# Patient Record
Sex: FEMALE | Race: BLACK OR AFRICAN AMERICAN | HISPANIC OR LATINO | URBAN - METROPOLITAN AREA
[De-identification: names, ages, dates, MRNs, and addresses within clinical notes are randomized per-mention and may not be internally consistent; named-entity substitution may affect disease eponyms.]

---

## 2022-07-09 ENCOUNTER — EMERGENCY (EMERGENCY)
Facility: HOSPITAL | Age: 37
LOS: 1 days | Discharge: HOME | End: 2022-07-09
Admitting: EMERGENCY MEDICINE

## 2022-07-09 VITALS
OXYGEN SATURATION: 99 % | TEMPERATURE: 98 F | HEART RATE: 80 BPM | DIASTOLIC BLOOD PRESSURE: 83 MMHG | RESPIRATION RATE: 18 BRPM | SYSTOLIC BLOOD PRESSURE: 122 MMHG

## 2022-07-09 VITALS
DIASTOLIC BLOOD PRESSURE: 71 MMHG | SYSTOLIC BLOOD PRESSURE: 118 MMHG | HEART RATE: 84 BPM | RESPIRATION RATE: 18 BRPM | TEMPERATURE: 98 F | OXYGEN SATURATION: 100 %

## 2022-07-09 PROCEDURE — 73590 X-RAY EXAM OF LOWER LEG: CPT | Mod: 26,LT

## 2022-07-09 PROCEDURE — 73610 X-RAY EXAM OF ANKLE: CPT | Mod: 26,LT,76

## 2022-07-09 PROCEDURE — 73610 X-RAY EXAM OF ANKLE: CPT | Mod: 26,LT

## 2022-07-09 PROCEDURE — 99284 EMERGENCY DEPT VISIT MOD MDM: CPT

## 2022-07-09 PROCEDURE — 73630 X-RAY EXAM OF FOOT: CPT | Mod: 26,LT

## 2022-07-09 RX ORDER — OXYCODONE AND ACETAMINOPHEN 5; 325 MG/1; MG/1
1 TABLET ORAL ONCE
Refills: 0 | Status: DISCONTINUED | OUTPATIENT
Start: 2022-07-09 | End: 2022-07-09

## 2022-07-09 RX ORDER — KETOROLAC TROMETHAMINE 30 MG/ML
30 SYRINGE (ML) INJECTION ONCE
Refills: 0 | Status: DISCONTINUED | OUTPATIENT
Start: 2022-07-09 | End: 2022-07-09

## 2022-07-09 RX ADMIN — OXYCODONE AND ACETAMINOPHEN 1 TABLET(S): 5; 325 TABLET ORAL at 17:54

## 2022-07-09 RX ADMIN — Medication 30 MILLIGRAM(S): at 17:54

## 2022-07-09 NOTE — ED PROVIDER NOTE - LOWER EXTREMITY EXAM, LEFT
+tenderness at lateral malleolar region; minimal swelling; no crepitus; no joint laxity, no redness, no warmth; 5/5 strength; sensation intact to light touch; < 2 sec capillary refill; 2+ pulses

## 2022-07-09 NOTE — ED PROVIDER NOTE - NSFOLLOWUPINSTRUCTIONS_ED_ALL_ED_FT
Advance activity as tolerated.  Continue all previously prescribed medications as directed unless otherwise instructed.  Take Tylenol 650mg (Two 325 mg pills) every 4-6 hours as needed for pain or fevers. Take Motrin (also sold as Advil or Ibuprofen) 400-600 mg (two or three 200 mg over the counter pills) every 8 hours as needed for moderate pain or fevers-- take with food.  Ambulate with aircast in place.  Ambulate with crutches as shown to you in the emergency department..  Keep extremity elevated and wrapped.  Apply cool compresses to affected area for 15 minutes, 3-4 times per day.  Follow up with your primary care physician and orthopedics (referral list provided or call [phone] to make an appointment with the orthopedics clinic) in 48-72 hours- bring copies of your results.  Return to the ER for worsening or persistent symptoms, including but not limited to worsening/persistent pain, fevers, numbness, weakness, swelling, falls, difficulty walking, and/or ANY NEW OR CONCERNING SYMPTOMS. If you have issues obtaining follow up, please call: [phone] to obtain a doctor or specialist who takes your insurance in your area.  You may call [phone] to make an appointment with the internal medicine clinic. Advance activity as tolerated.  Continue all previously prescribed medications as directed unless otherwise instructed.  Take Tylenol 650mg (Two 325 mg pills) every 4-6 hours as needed for pain or fevers. Take Motrin (also sold as Advil or Ibuprofen) 400-600 mg (two or three 200 mg over the counter pills) every 8 hours as needed for moderate pain or fevers-- take with food.  Keep splint clean and dry.  Follow up with your primary care physician and orthopedics (Dr. Lazaro; referral list provided or call [phone] to make an appointment with the orthopedics clinic) in 48-72 hours- bring copies of your results.  Return to the ER for worsening or persistent symptoms, including but not limited to worsening/persistent pain, fevers, numbness, weakness, swelling, falls, difficulty walking, and/or ANY NEW OR CONCERNING SYMPTOMS. If you have issues obtaining follow up, please call: [phone] to obtain a doctor or specialist who takes your insurance in your area.  You may call [phone] to make an appointment with the internal medicine clinic.

## 2022-07-09 NOTE — ED PROVIDER NOTE - CARE PROVIDER_API CALL
Home
David Lazaro)  Orthopedics  [address] [place] [address]  Phone: [phone]  Fax: [phone]  Follow Up Time:

## 2022-07-09 NOTE — ED PROVIDER NOTE - PATIENT PORTAL LINK FT
You can access the FollowMyHealth Patient Portal offered by [facility] by registering at the following website: [email or web address]. By joining [facility]’s FollowMyHealth portal, you will also be able to view your health information using other applications (apps) compatible with our system.

## 2022-07-09 NOTE — ED ADULT TRIAGE NOTE - CHIEF COMPLAINT QUOTE
Patient brought to ER by EMS from [facility] and injured her left ankle.

## 2022-07-09 NOTE — CONSULT NOTE ADULT - SUBJECTIVE AND OBJECTIVE BOX
Orthopedic Surgery Consult Note    37yFemale p/w left ankle pain and inability to bear weight s/p mechanical fall. Patient twisted her ankle while roller skating and felt immediate pain. Denies headstrike/LOC. Denies numbness/tingling in the feet/toes. No other bone or joint complaints.               Vital Signs Last 24 Hrs  T(C): 36.9 (07-09-22 @ 16:48), Max: 36.9 (07-09-22 @ 16:48)  T(F): 98.5 (07-09-22 @ 16:48), Max: 98.5 (07-09-22 @ 16:48)  HR: 84 (07-09-22 @ 16:48) (80 - 84)  BP: 118/71 (07-09-22 @ 16:48) (118/71 - 122/83)  BP(mean): --  RR: 18 (07-09-22 @ 16:48) (18 - 18)  SpO2: 100% (07-09-22 @ 16:48) (99% - 100%)    Physical Exam  Gen: Nad  LLE: Skin intact swelling about ankle  +TTP medial/lateral malleolus  motor intact distally GS/TA/EHL/FHL  SILT S/S/SP/SP/T  2+ DP pulse, extremity WWP    Imaging:  XR showing left Barrera C distal fibula fracture with widening of the medial clear space    Procedure: Closed reduction performed followed by placement of a well padded trilam splint. Patient tolerated the procedure well. Post procedure imaging obtained and showed improved alignment, but ankle was still subluxed. New reduction attempted with new trilam splint. Post procedure imaging obtained and showed acceptable alignment. Post procedure the patient was NV intact.    A/P: 37yFemale with left bimalleolar equivalent ankle fracture s/p closed reduction and splinting    - Pain control  - NWB on left lower extremity in splint  - Keep splint clean, dry and intact until follow up  - Cane/crutches/walker as needed  - Ice/elevation  - Follow up with Dr. Lazaro early next week, call [phone]  for appointment

## 2022-07-09 NOTE — CONSULT NOTE ADULT - ASSESSMENT
A/P: 37yFemale with left bimalleolar equivalent ankle fracture s/p closed reduction and splinting    - Pain control  - NWB on left lower extremity in splint  - Keep splint clean, dry and intact until follow up  - Cane/crutches/walker as needed  - Ice/elevation  - Follow up with Dr. Lazaro early next week, call [phone]  for appointment

## 2022-07-09 NOTE — ED PROVIDER NOTE - CLINICAL SUMMARY MEDICAL DECISION MAKING FREE TEXT BOX
Pt is a 36 y/o F no pertinent PMhx p/w left ankle pain today. -- likely sprain; r/o fracture -- xray ankle/tib/fib/foot

## 2022-07-09 NOTE — ED ADULT NURSE NOTE - CHIEF COMPLAINT QUOTE
Patient brought to ER by EMS from [facility] and injured her left ankle.

## 2022-07-09 NOTE — ED PROVIDER NOTE - PROGRESS NOTE DETAILS
CARIE GARCIA:  Xray with following impression: "There is a spiral fracture of the distal fibula above the lateral   malleolus. No additional fractures are identified. There is no proximal   fibular dislocation. The ankle mortise is congruent. Soft tissue swelling   is seen around the ankle more pronounced over the medial malleolus."  Reduction and cast applied by orthopedics.  They recommend discharge and outpatient follow up with Dr. Lazaro.   Pt medically stable for discharge.  Strict return precautions given. Pt to follow up with PMD and ortho.

## 2022-07-09 NOTE — ED PROVIDER NOTE - OBJECTIVE STATEMENT
Pt is a 38 y/o F no pertinent PMhx p/w left ankle pain today.  Pt reports ~1 hour ago, while roller skating, pt twisted left ankle in an eversion manner, causing pt to fall and land onto hands w/o head trauma or LOC. Pt reports sudden onset severe pain at lateral malleolar region; has not attempted to ambulate since.  Pt reports pain significantly improved, 2/10 in intensity, after application of cool compress.  Denies any fevers, chills, nausea, vomiting, headache, neck pain, back pain, chest pain, abdominal pain, hip pain, injuries elsewhere, use of blood thinners or any other specific complaints.

## 2022-07-10 PROBLEM — Z00.00 ENCOUNTER FOR PREVENTIVE HEALTH EXAMINATION: Status: ACTIVE | Noted: 2022-07-10

## 2022-07-13 ENCOUNTER — APPOINTMENT (OUTPATIENT)
Dept: ORTHOPEDIC SURGERY | Facility: CLINIC | Age: 37
End: 2022-07-13

## 2022-07-13 VITALS — HEIGHT: 64 IN | BODY MASS INDEX: 31.58 KG/M2 | WEIGHT: 185 LBS

## 2022-07-13 PROCEDURE — 73610 X-RAY EXAM OF ANKLE: CPT | Mod: LT

## 2022-07-13 PROCEDURE — 99203 OFFICE O/P NEW LOW 30 MIN: CPT

## 2022-07-13 NOTE — DISCUSSION/SUMMARY
[de-identified] : 37-year-old woman with a bimalleolar left ankle fracture, approximately 4 days out.\par \par -The risks and benefits of operative versus nonoperative management were discussed at length with the patient. The patient shows a good understanding of the injury and treatment options. They would like to move forward with operative treatment.\par -Elevation\par -NSAIDs and Tylenol for pain\par -Nonweightbearing, continue use of crutches\par -Schedule surgery for Monday 7/18/22\par -All of the patient's questions and concerns were addressed.\par

## 2022-07-13 NOTE — PHYSICAL EXAM
[de-identified] : The patient is sitting comfortably in the exam room. \par Left ankle\par - AO splint intact and dry\par -Wiggles toes\par -Sensation in toes intact\par -Toes are warm and well-perfused [de-identified] : Xrays of the left ankle were taken in the office today, 7/13/22.  AP oblique and lateral.  X-rays show the patient is in an AO splint.  There is widening of the medial clear space between the talus and the medial malleolus.  There is widening at the syndesmosis.  There is a long oblique fracture of the fibula above the plafond.  The talus is under the tibia.

## 2022-07-13 NOTE — HISTORY OF PRESENT ILLNESS
[de-identified] : TINY Hall is a 37 y.o woman presenting to the office with a bimalleolar left ankle fracture that was sustained on Saturday 7/9/22, she was evaluated by BETH and was splinted and given crutches. She states she was roller-skating and tripped and twisted her ankle.  She denies any numbness or tingling in her foot.  Of note she had an ankle fracture on the right side in the past which was treated with surgery and she is doing well.  She works as an .

## 2022-07-14 ENCOUNTER — OUTPATIENT (OUTPATIENT)
Dept: OUTPATIENT SERVICES | Facility: HOSPITAL | Age: 37
LOS: 1 days | End: 2022-07-14
Payer: COMMERCIAL

## 2022-07-14 VITALS
DIASTOLIC BLOOD PRESSURE: 80 MMHG | RESPIRATION RATE: 15 BRPM | HEART RATE: 77 BPM | SYSTOLIC BLOOD PRESSURE: 117 MMHG | TEMPERATURE: 99 F | HEIGHT: 64.5 IN | OXYGEN SATURATION: 99 % | WEIGHT: 184.97 LBS

## 2022-07-14 DIAGNOSIS — E89.0 POSTPROCEDURAL HYPOTHYROIDISM: Chronic | ICD-10-CM

## 2022-07-14 DIAGNOSIS — Z98.890 OTHER SPECIFIED POSTPROCEDURAL STATES: Chronic | ICD-10-CM

## 2022-07-14 DIAGNOSIS — Z11.52 ENCOUNTER FOR SCREENING FOR COVID-19: ICD-10-CM

## 2022-07-14 DIAGNOSIS — S82.892A OTHER FRACTURE OF LEFT LOWER LEG, INITIAL ENCOUNTER FOR CLOSED FRACTURE: ICD-10-CM

## 2022-07-14 DIAGNOSIS — Z01.818 ENCOUNTER FOR OTHER PREPROCEDURAL EXAMINATION: ICD-10-CM

## 2022-07-14 LAB
HCT VFR BLD CALC: 39.8 % — SIGNIFICANT CHANGE UP (ref 34.5–45)
HGB BLD-MCNC: 12.5 G/DL — SIGNIFICANT CHANGE UP (ref 11.5–15.5)
MCHC RBC-ENTMCNC: 27.5 PG — SIGNIFICANT CHANGE UP (ref 27–34)
MCHC RBC-ENTMCNC: 31.4 GM/DL — LOW (ref 32–36)
MCV RBC AUTO: 87.5 FL — SIGNIFICANT CHANGE UP (ref 80–100)
NRBC # BLD: 0 /100 WBCS — SIGNIFICANT CHANGE UP (ref 0–0)
PLATELET # BLD AUTO: 267 K/UL — SIGNIFICANT CHANGE UP (ref 150–400)
RBC # BLD: 4.55 M/UL — SIGNIFICANT CHANGE UP (ref 3.8–5.2)
RBC # FLD: 14.2 % — SIGNIFICANT CHANGE UP (ref 10.3–14.5)
SARS-COV-2 RNA SPEC QL NAA+PROBE: SIGNIFICANT CHANGE UP
WBC # BLD: 7.73 K/UL — SIGNIFICANT CHANGE UP (ref 3.8–10.5)
WBC # FLD AUTO: 7.73 K/UL — SIGNIFICANT CHANGE UP (ref 3.8–10.5)

## 2022-07-14 PROCEDURE — C9803: CPT

## 2022-07-14 PROCEDURE — U0005: CPT

## 2022-07-14 PROCEDURE — U0003: CPT

## 2022-07-14 RX ORDER — SODIUM CHLORIDE 9 MG/ML
3 INJECTION INTRAMUSCULAR; INTRAVENOUS; SUBCUTANEOUS EVERY 8 HOURS
Refills: 0 | Status: DISCONTINUED | OUTPATIENT
Start: 2022-07-18 | End: 2022-07-18

## 2022-07-14 RX ORDER — LIDOCAINE HCL 20 MG/ML
0.2 VIAL (ML) INJECTION ONCE
Refills: 0 | Status: DISCONTINUED | OUTPATIENT
Start: 2022-07-18 | End: 2022-07-18

## 2022-07-14 RX ORDER — CEFAZOLIN SODIUM 1 G
2000 VIAL (EA) INJECTION ONCE
Refills: 0 | Status: DISCONTINUED | OUTPATIENT
Start: 2022-07-18 | End: 2022-08-01

## 2022-07-14 NOTE — H&P PST ADULT - NSICDXPASTSURGICALHX_GEN_ALL_CORE_FT
PAST SURGICAL HISTORY:  H/O thyroidectomy as a new born, secondary to thyroid cancer    History of ankle surgery right ankle ORIF    S/P LASIK surgery     Status post breast reduction

## 2022-07-14 NOTE — H&P PST ADULT - HISTORY OF PRESENT ILLNESS
38 yo female. PMD (thyroid cancer as a , s/p thyroidectomy surgery, denies h/o XRT, chemo, last follow up with endocrinologist/oncologist was during childhood, no further follow up was needed per pt).  s/p mechanical fall while roller blading on 2022, diagnosed with fracture of left ankle, evaluted by Dr. Lazaro, now presents to [facility] scheduled for ORIF surgery on .  covid test done on  at [facility].   **discussed case with anesthesiologist, Dr. Blandon, pt does not need preop evaluation.**

## 2022-07-14 NOTE — H&P PST ADULT - NSANTHOSAYNRD_GEN_A_CORE
No. BARBY screening performed.  STOP BANG Legend: 0-2 = LOW Risk; 3-4 = INTERMEDIATE Risk; 5-8 = HIGH Risk

## 2022-07-14 NOTE — H&P PST ADULT - MUSCULOSKELETAL COMMENTS
LLE ankle with soft cast, ace wrap, able to wiggle all toes, pink, warm to touch, with trace edema see hpi, able to wiggle toes on LLE

## 2022-07-17 ENCOUNTER — TRANSCRIPTION ENCOUNTER (OUTPATIENT)
Age: 37
End: 2022-07-17

## 2022-07-18 ENCOUNTER — TRANSCRIPTION ENCOUNTER (OUTPATIENT)
Age: 37
End: 2022-07-18

## 2022-07-18 ENCOUNTER — APPOINTMENT (OUTPATIENT)
Dept: ORTHOPEDIC SURGERY | Facility: HOSPITAL | Age: 37
End: 2022-07-18

## 2022-07-18 ENCOUNTER — OUTPATIENT (OUTPATIENT)
Dept: OUTPATIENT SERVICES | Facility: HOSPITAL | Age: 37
LOS: 1 days | End: 2022-07-18
Payer: COMMERCIAL

## 2022-07-18 VITALS
SYSTOLIC BLOOD PRESSURE: 114 MMHG | DIASTOLIC BLOOD PRESSURE: 62 MMHG | OXYGEN SATURATION: 98 % | RESPIRATION RATE: 16 BRPM | HEART RATE: 86 BPM | TEMPERATURE: 98 F

## 2022-07-18 VITALS
SYSTOLIC BLOOD PRESSURE: 104 MMHG | OXYGEN SATURATION: 100 % | DIASTOLIC BLOOD PRESSURE: 72 MMHG | HEART RATE: 85 BPM | TEMPERATURE: 98 F | RESPIRATION RATE: 18 BRPM | HEIGHT: 64.5 IN

## 2022-07-18 DIAGNOSIS — Z98.890 OTHER SPECIFIED POSTPROCEDURAL STATES: Chronic | ICD-10-CM

## 2022-07-18 DIAGNOSIS — S82.892A OTHER FRACTURE OF LEFT LOWER LEG, INITIAL ENCOUNTER FOR CLOSED FRACTURE: ICD-10-CM

## 2022-07-18 DIAGNOSIS — E89.0 POSTPROCEDURAL HYPOTHYROIDISM: Chronic | ICD-10-CM

## 2022-07-18 PROBLEM — C73 MALIGNANT NEOPLASM OF THYROID GLAND: Chronic | Status: ACTIVE | Noted: 2022-07-14

## 2022-07-18 LAB — HCG UR QL: NEGATIVE — SIGNIFICANT CHANGE UP

## 2022-07-18 PROCEDURE — 27786 TREATMENT OF ANKLE FRACTURE: CPT | Mod: LT

## 2022-07-18 PROCEDURE — C1713: CPT

## 2022-07-18 PROCEDURE — 81025 URINE PREGNANCY TEST: CPT

## 2022-07-18 PROCEDURE — 27792 TREATMENT OF ANKLE FRACTURE: CPT | Mod: LT

## 2022-07-18 PROCEDURE — 27829 TREAT LOWER LEG JOINT: CPT | Mod: LT

## 2022-07-18 PROCEDURE — 85027 COMPLETE CBC AUTOMATED: CPT

## 2022-07-18 PROCEDURE — 76000 FLUOROSCOPY <1 HR PHYS/QHP: CPT

## 2022-07-18 PROCEDURE — 77071 MNL APPL STRS JT RADIOGRAPHY: CPT

## 2022-07-18 PROCEDURE — G0463: CPT

## 2022-07-18 DEVICE — SCREW 3.5X14MM: Type: IMPLANTABLE DEVICE | Site: LEFT | Status: FUNCTIONAL

## 2022-07-18 DEVICE — IMPLANTABLE DEVICE: Type: IMPLANTABLE DEVICE | Site: LEFT | Status: FUNCTIONAL

## 2022-07-18 DEVICE — SCREW LOKG 3.5X14MM: Type: IMPLANTABLE DEVICE | Site: LEFT | Status: FUNCTIONAL

## 2022-07-18 DEVICE — KIT REPAIR TIGHTROPE W/ DRV SYNDESMOSIS: Type: IMPLANTABLE DEVICE | Site: LEFT | Status: FUNCTIONAL

## 2022-07-18 DEVICE — SCREW FT 3.5X12MM: Type: IMPLANTABLE DEVICE | Site: LEFT | Status: FUNCTIONAL

## 2022-07-18 DEVICE — SCREW LOCKING 3.5X12: Type: IMPLANTABLE DEVICE | Site: LEFT | Status: FUNCTIONAL

## 2022-07-18 RX ORDER — FAMOTIDINE 10 MG/ML
1 INJECTION INTRAVENOUS
Qty: 0 | Refills: 0 | DISCHARGE
Start: 2022-07-18

## 2022-07-18 RX ORDER — ASPIRIN/CALCIUM CARB/MAGNESIUM 324 MG
325 TABLET ORAL
Refills: 0 | Status: DISCONTINUED | OUTPATIENT
Start: 2022-07-18 | End: 2022-08-01

## 2022-07-18 RX ORDER — HYDROMORPHONE HYDROCHLORIDE 2 MG/ML
0.5 INJECTION INTRAMUSCULAR; INTRAVENOUS; SUBCUTANEOUS
Refills: 0 | Status: DISCONTINUED | OUTPATIENT
Start: 2022-07-18 | End: 2022-07-18

## 2022-07-18 RX ORDER — OXYCODONE HYDROCHLORIDE 5 MG/1
5 TABLET ORAL ONCE
Refills: 0 | Status: DISCONTINUED | OUTPATIENT
Start: 2022-07-18 | End: 2022-07-18

## 2022-07-18 RX ORDER — FAMOTIDINE 10 MG/ML
20 INJECTION INTRAVENOUS
Refills: 0 | Status: DISCONTINUED | OUTPATIENT
Start: 2022-07-18 | End: 2022-08-01

## 2022-07-18 RX ORDER — OXYCODONE HYDROCHLORIDE 5 MG/1
1 TABLET ORAL
Qty: 20 | Refills: 0
Start: 2022-07-18 | End: 2022-07-24

## 2022-07-18 RX ORDER — IBUPROFEN 200 MG
1 TABLET ORAL
Qty: 0 | Refills: 0 | DISCHARGE
Start: 2022-07-18

## 2022-07-18 RX ORDER — ASPIRIN/CALCIUM CARB/MAGNESIUM 324 MG
1 TABLET ORAL
Qty: 60 | Refills: 0
Start: 2022-07-18 | End: 2022-08-16

## 2022-07-18 RX ORDER — OXYCODONE HYDROCHLORIDE 5 MG/1
5 TABLET ORAL EVERY 6 HOURS
Refills: 0 | Status: DISCONTINUED | OUTPATIENT
Start: 2022-07-18 | End: 2022-07-18

## 2022-07-18 RX ORDER — ONDANSETRON 8 MG/1
4 TABLET, FILM COATED ORAL ONCE
Refills: 0 | Status: DISCONTINUED | OUTPATIENT
Start: 2022-07-18 | End: 2022-07-18

## 2022-07-18 RX ORDER — ASPIRIN/CALCIUM CARB/MAGNESIUM 324 MG
1 TABLET ORAL
Qty: 30 | Refills: 0
Start: 2022-07-18 | End: 2022-08-16

## 2022-07-18 RX ORDER — IBUPROFEN 200 MG
400 TABLET ORAL EVERY 8 HOURS
Refills: 0 | Status: DISCONTINUED | OUTPATIENT
Start: 2022-07-18 | End: 2022-08-01

## 2022-07-18 RX ORDER — OXYCODONE HYDROCHLORIDE 5 MG/1
1 TABLET ORAL
Qty: 30 | Refills: 0
Start: 2022-07-18

## 2022-07-18 RX ADMIN — HYDROMORPHONE HYDROCHLORIDE 0.5 MILLIGRAM(S): 2 INJECTION INTRAMUSCULAR; INTRAVENOUS; SUBCUTANEOUS at 18:15

## 2022-07-18 RX ADMIN — HYDROMORPHONE HYDROCHLORIDE 0.5 MILLIGRAM(S): 2 INJECTION INTRAMUSCULAR; INTRAVENOUS; SUBCUTANEOUS at 18:00

## 2022-07-18 RX ADMIN — HYDROMORPHONE HYDROCHLORIDE 0.5 MILLIGRAM(S): 2 INJECTION INTRAMUSCULAR; INTRAVENOUS; SUBCUTANEOUS at 17:45

## 2022-07-18 RX ADMIN — OXYCODONE HYDROCHLORIDE 5 MILLIGRAM(S): 5 TABLET ORAL at 19:28

## 2022-07-18 RX ADMIN — HYDROMORPHONE HYDROCHLORIDE 0.5 MILLIGRAM(S): 2 INJECTION INTRAMUSCULAR; INTRAVENOUS; SUBCUTANEOUS at 17:35

## 2022-07-18 RX ADMIN — OXYCODONE HYDROCHLORIDE 5 MILLIGRAM(S): 5 TABLET ORAL at 20:00

## 2022-07-18 NOTE — PRE-ANESTHESIA EVALUATION ADULT - NSANTHADDINFOFT_GEN_ALL_CORE
extensive rba dw pt at bedside, agrees with plan. surgeon specifically declines nerve block for post op pain

## 2022-07-18 NOTE — ASU DISCHARGE PLAN (ADULT/PEDIATRIC) - NS MD DC FALL RISK RISK
For information on Fall & Injury Prevention, visit: [email or web address] OR  [email or web address] OR  https://www.cdc.gov/steadi/patient.html

## 2022-07-18 NOTE — ASU DISCHARGE PLAN (ADULT/PEDIATRIC) - ASU DC SPECIAL INSTRUCTIONSFT
Follow up with Dr Lazaro in 7-10 days. Call office for appointment. Take medications as prescribed. Keep dressing clean, dry, and intact. Rest, ice, and elevate affected extremity. Non weight bearing left lower extremity Follow up with Dr Lazaro in 7-10 days.   Call office for appointment.   Take medications as prescribed. Keep dressing clean, dry, and intact.   Rest, ice, and elevate affected extremity.   STRICT Non weight bearing left lower extremity    COVID REMINDER: You are being discharged from the hospital. Please keep in mind that the CORONAVIRUS is still in our community.   So, try to restrict activities outside of your home except for getting medical care and simple errands [until seen by your Surgeon]. Call ahead before visiting your doctor.  Wear a facemask when you are outside and around other people. Cover your cough and sneezes.  Clean your hands often.  Try to avoid sharing personal household items.  Clean all frequently touched surfaces daily.

## 2022-07-18 NOTE — ASU PATIENT PROFILE, ADULT - FALL HARM RISK - HARM RISK INTERVENTIONS

## 2022-07-18 NOTE — ASU PATIENT PROFILE, ADULT - FALL HARM RISK - FACTORS NURSING JUDGEMENT
Yes
[de-identified] : Pt presented for PE.  Last PE was 1/2018.  Her health was uneventful since her last visit, she has no new complaint. \par \par Her children think she may have sleep apnea, as the pt has snoring, and has some fatigue during the day.\par \par She also wants Rx for mammogram.

## 2022-07-18 NOTE — ASU DISCHARGE PLAN (ADULT/PEDIATRIC) - CARE PROVIDER_API CALL
David Lazaro)  Orthopedics  [address] [place] [address]  Phone: [phone]  Fax: [phone]  Follow Up Time:

## 2022-07-18 NOTE — CHART NOTE - NSCHARTNOTEFT_GEN_A_CORE
POC    Pt seen in PACU  No complaints @ present  Pain appears under control  No c/o SOB Chest Pain n/v     T(C): 36.1 (07-18-22 @ 17:18), Max: 36.7 (07-18-22 @ 11:50)  HR: 72 (07-18-22 @ 19:30) (72 - 90)  BP: 112/71 (07-18-22 @ 19:30) (104/72 - 141/80)  RR: 18 (07-18-22 @ 19:30) (14 - 18)  SpO2: 99% (07-18-22 @ 19:30) (94% - 100%)          O/E:   GEN: Awake, alert, in NAD  CHEST: CTAB   ABD: Soft / benign ND/ NT  EXT:  LLE        [x] CAST/ Splint C/D         Compartments / Calves soft         Moving all toes / digits well-perfused         Sensation in tact         Cap refill 2-3 secs    Assessment:          37yFemale  s/p Open Reduction Internal Fixation / Surgical Repair L ankle    Plan:  -  Ice / elevate  -  NWB  LLE  -  DVT Proph:  -  Pain Control: IV/PO Rx  -  Dispo: d/c home   Follow up Dr Lazaro in office      ***See Above  Saad DARNELL  Orthopedics  B: 1402/3302 POC    Pt seen in PACU  No complaints @ present  Pain appears under control  No c/o SOB Chest Pain n/v     T(C): 36.1 (07-18-22 @ 17:18), Max: 36.7 (07-18-22 @ 11:50)  HR: 72 (07-18-22 @ 19:30) (72 - 90)  BP: 112/71 (07-18-22 @ 19:30) (104/72 - 141/80)  RR: 18 (07-18-22 @ 19:30) (14 - 18)  SpO2: 99% (07-18-22 @ 19:30) (94% - 100%)          O/E:   GEN: Awake, alert, in NAD  CHEST: CTAB   ABD: Soft / benign ND/ NT  EXT:  LLE        [x] CAST/ Splint C/D         Compartments / Calves soft         Moving all toes / digits well-perfused         Sensation in tact         Cap refill 2-3 secs    Assessment:          37yFemale  s/p Open Reduction Internal Fixation / Surgical Repair L ankle    Plan:  -  Ice / elevate  -  NWB  LLE  -  DVT Proph:  -  Pain Control: IV/PO Rx  -  Dispo: d/c home   Follow up Dr Lazaro in office    I-Stop Reference #: [id number]      ***See Above  Saad DARNELL  Orthopedics  B: [id number]

## 2022-08-04 ENCOUNTER — APPOINTMENT (OUTPATIENT)
Dept: ORTHOPEDIC SURGERY | Facility: CLINIC | Age: 37
End: 2022-08-04

## 2022-08-04 VITALS — HEIGHT: 64 IN | BODY MASS INDEX: 31.58 KG/M2 | WEIGHT: 185 LBS

## 2022-08-04 PROCEDURE — 99024 POSTOP FOLLOW-UP VISIT: CPT

## 2022-08-04 PROCEDURE — 73610 X-RAY EXAM OF ANKLE: CPT | Mod: LT

## 2022-08-04 NOTE — HISTORY OF PRESENT ILLNESS
[de-identified] : ORIF left ankle, biomalleolar 7/18/22 [de-identified] : 37-year-old woman approximately 3 weeks out from ORIF left distal fibula with syndesmotic fixation.  This is her first postop visit.  She denies any fevers or chills.  She reports her pain is well controlled.  She also reports she has had low pain tolerance. [de-identified] : The patient is sitting comfortably in the exam room. \par Left ankle\par \par -Incision is clean and dry, no erythema, no signs of infection\par -No pain with palpation over the medial malleolus\par -No pain with palpation over the dorsum of the foot, no plantar ecchymoses, no pain with movement of the first metatarsal relative to the second metatarsal\par -No subluxation of the peroneal tendons\par -Dorsiflexion: Neutral, Plantarflexion: 45\par -Sensation is intact L1-S1\par -5/5 EHL, FHL, TA, GS\par -Foot is warm and well-perfused, palpable dorsalis pedis pulse [de-identified] : X-rays of the left ankle were taken in the office today including AP, mortise, and lateral.  X-rays show good overall alignment of the ankle.  No displacement of the fracture.  Good position of the implants. [de-identified] : 37-year-old woman approximately 3 weeks out from ORIF left distal fibula with syndesmotic fixation [de-identified] : - Nonweightbearing\par -Range of motion exercises, these were demonstrated in the office\par -Physical therapy\par -Cam boot\par -Tylenol and NSAIDs for pain\par -Follow-up in 4 weeks with x-rays of the left ankle at that time\par -All the patient's questions and concerns were addressed during this visit

## 2022-08-31 ENCOUNTER — APPOINTMENT (OUTPATIENT)
Dept: ORTHOPEDIC SURGERY | Facility: CLINIC | Age: 37
End: 2022-08-31
Payer: COMMERCIAL

## 2022-08-31 DIAGNOSIS — S82.892A OTHER FRACTURE OF LEFT LOWER LEG, INITIAL ENCOUNTER FOR CLOSED FRACTURE: ICD-10-CM

## 2022-08-31 PROCEDURE — 99024 POSTOP FOLLOW-UP VISIT: CPT

## 2022-10-05 ENCOUNTER — APPOINTMENT (OUTPATIENT)
Dept: ORTHOPEDIC SURGERY | Facility: CLINIC | Age: 37
End: 2022-10-05

## 2022-10-05 PROCEDURE — 99024 POSTOP FOLLOW-UP VISIT: CPT

## 2022-10-05 PROCEDURE — 73610 X-RAY EXAM OF ANKLE: CPT | Mod: LT

## 2022-10-05 NOTE — HISTORY OF PRESENT ILLNESS
[de-identified] : s/p ORIF left ankle, biomalleolar 7/18/22 [de-identified] : TINY Ybarra is a 37 y.o. woman who presents for her third post op appointment s/p  ORIF left distal fibula with syndesmotic fixation from 7/18/22. Since her last visit she is feeling improvement with occasional ankle pain. She has stopped using CAM boot and uses a regular shoe. She denies any fevers or chills.  She reports her pain is well controlled.   [de-identified] : The patient is sitting comfortably in the exam room. \par Left ankle\par -Incision is well-healed, no erythema, no signs of infection\par -No tenderness to palpation over the lateral aspect of the distal fibula\par -No pain with palpation over the medial malleolus\par -Mild tenderness to palpation over the posterior aspect of the fibula over the peroneal tendons\par -No subluxation of the peroneal tendons\par -Dorsiflexion: Neutral 5, Plantarflexion: 45\par -Sensation is intact L1-S1\par -5/5 EHL, FHL, TA, GS\par -Foot is warm and well-perfused, palpable dorsalis pedis pulse [de-identified] : X-rays of the left ankle were taken in the office today 10/5/22 including AP, mortise, and lateral.  X-rays show good overall alignment of the ankle.  No displacement of the fracture.  Good position of the implants with no displacement. [de-identified] : 37-year-old woman approximately 11 weeks out from ORIF left distal fibula with syndesmotic fixation now with mild peroneal tendinitis. [de-identified] : -Weightbearing as tolerated\par -Provided note to return to work full duty as an \par -Use NSAIDs for inflammation\par -Continue Physical therapy\par -Follow-up in 3 months with x-rays of the left ankle at that time\par -All the patient's questions and concerns were addressed during this visit

## 2022-11-02 ENCOUNTER — APPOINTMENT (OUTPATIENT)
Dept: ORTHOPEDIC SURGERY | Facility: CLINIC | Age: 37
End: 2022-11-02

## 2023-01-03 PROBLEM — S82.892A ANKLE FRACTURE, LEFT: Status: ACTIVE | Noted: 2022-07-13

## 2023-01-03 NOTE — HISTORY OF PRESENT ILLNESS
[de-identified] : ORIF left ankle, biomalleolar 7/18/22 [de-identified] : TINY Ybarra is a 37-year-old woman approximately 6 weeks out from ORIF left distal fibula with syndesmotic fixation.  This is her second postop visit. Since her last visit she is feeling great. She has been doing Physical Therapy.  She denies any fevers or chills.  She reports her pain is well controlled.   [de-identified] : The patient is sitting comfortably in the exam room. \par Left ankle\par -Incision is clean and dry, no erythema, no signs of infection\par -No pain with palpation over the medial malleolus\par -No pain with palpation over the dorsum of the foot, no plantar ecchymoses, no pain with movement of the first metatarsal relative to the second metatarsal\par -No subluxation of the peroneal tendons\par -Dorsiflexion: Neutral, Plantarflexion: 45\par -Sensation is intact L1-S1\par -5/5 EHL, FHL, TA, GS\par -Foot is warm and well-perfused, palpable dorsalis pedis pulse [de-identified] : X-rays of the left ankle were taken in the office today including AP, mortise, and lateral.  X-rays show good overall alignment of the ankle.  No displacement of the fracture.  Good position of the implants. [de-identified] : 37-year-old woman approximately 6 weeks out from ORIF left distal fibula with syndesmotic fixation [de-identified] : -Weightbearing as tolerated\par -Provided lace up ankle\par -Transition from CAM boot to lace up in 1 month: 10/1/22\par -Continue Physical therapy\par -Follow-up in 2 months with x-rays of the left ankle at that time\par -All the patient's questions and concerns were addressed during this visit

## 2023-01-05 ENCOUNTER — APPOINTMENT (OUTPATIENT)
Dept: ORTHOPEDIC SURGERY | Facility: CLINIC | Age: 38
End: 2023-01-05

## 2023-01-10 ENCOUNTER — APPOINTMENT (OUTPATIENT)
Dept: UROGYNECOLOGY | Facility: CLINIC | Age: 38
End: 2023-01-10
Payer: COMMERCIAL

## 2023-01-10 VITALS
TEMPERATURE: 97.6 F | DIASTOLIC BLOOD PRESSURE: 62 MMHG | SYSTOLIC BLOOD PRESSURE: 106 MMHG | WEIGHT: 200 LBS | OXYGEN SATURATION: 99 % | BODY MASS INDEX: 34.15 KG/M2 | HEART RATE: 72 BPM | HEIGHT: 64 IN

## 2023-01-10 DIAGNOSIS — Z87.81 PERSONAL HISTORY OF (HEALED) TRAUMATIC FRACTURE: ICD-10-CM

## 2023-01-10 DIAGNOSIS — Z78.9 OTHER SPECIFIED HEALTH STATUS: ICD-10-CM

## 2023-01-10 DIAGNOSIS — Z85.850 PERSONAL HISTORY OF MALIGNANT NEOPLASM OF THYROID: ICD-10-CM

## 2023-01-10 LAB
BILIRUB UR QL STRIP: NORMAL
CLARITY UR: NORMAL
COLLECTION METHOD: NORMAL
GLUCOSE UR-MCNC: NORMAL
HCG UR QL: 0.2 EU/DL
HGB UR QL STRIP.AUTO: NORMAL
KETONES UR-MCNC: NORMAL
LEUKOCYTE ESTERASE UR QL STRIP: NORMAL
NITRITE UR QL STRIP: NORMAL
PH UR STRIP: 5.5
PROT UR STRIP-MCNC: NORMAL
SP GR UR STRIP: 1.03

## 2023-01-10 PROCEDURE — 99204 OFFICE O/P NEW MOD 45 MIN: CPT

## 2023-01-10 PROCEDURE — 81003 URINALYSIS AUTO W/O SCOPE: CPT | Mod: QW

## 2023-01-10 NOTE — DISCUSSION/SUMMARY
[FreeTextEntry1] : 38 yo with symptomatic urethral diverticula. We discussed etiology and management approach. We discussed diverticulectomy and risk of development of REJI symptoms post-operatively. Given that she does not currently have significant REJI symptoms, would recommend staged approach and expectant management after excision and repair of diverticula. Patient is agreeable to this approach. We also discussed the likelihood of discharge with urethral catheter. Will book for urethral diverticulectomy, cystourethroscopy. She will complete course of antibiotics as prescribed. Will follow-up fluid culture and UA/UCx.

## 2023-01-10 NOTE — PHYSICAL EXAM
[Normal] : normal external genitalia [Normal Appearance] : general appearance was normal [FreeTextEntry1] : General: Well, appearing, no acute distress\par HEENT: Normocephalic, atraumatic\par Respiratory: Speaking in full sentences comfortably, normal work of breathing and no cough during visit\par Extremities: No upper extremity edema noted\par Skin: No obvious rash or skin lesions\par Neuro: Alert and oriented x 3, speech is fluent, normal rate\par Psych: Normal mood and affect [FreeTextEntry3] : 3 x 4 cm distal midline suburethral mass with purulent discharge expressed, non tender, no erythema. [FreeTextEntry4] : As above 3 x 4 cm anterior vaginal wall mass.

## 2023-01-10 NOTE — HISTORY OF PRESENT ILLNESS
[FreeTextEntry1] : TINY DEL CASTILLO is a 37 year old P2 referred for management of urethral diverticulum. Symptoms started 1 month ago with urgency and dysuria. Urine culture was negative, but exam was notable for a suburethral mass by her GYN. She was referred to urologist (Dr. English) who performed a cystoscopy yesterday. Given evidence of purulent discharge, she was started on Cefdinir. \par She also underwent an MRI that confirm two adjacent posterior urethral diverticula at 6 and 7 o'clock measuring 0.6 cm x 0.8 cm x 1.0 cm and 0.8 cm x 1.6 cm x 0.9 cm respectively (see scanned document). She denies any discomfort or pain. Urinary symptoms have since improved. She denies any significant stress urinary incontinence. Occasionally feels that she might leak with sneezing, but is generally continent. No other complaints. No systemic symptoms. No hematuria. No dyspareunia.

## 2023-01-11 PROBLEM — Z78.9 NON-SMOKER: Status: ACTIVE | Noted: 2023-01-11

## 2023-01-11 PROBLEM — Z87.81 HISTORY OF FRACTURE OF ANKLE: Status: RESOLVED | Noted: 2023-01-11 | Resolved: 2023-01-11

## 2023-01-11 PROBLEM — Z85.850 HISTORY OF MALIGNANT NEOPLASM OF THYROID: Status: RESOLVED | Noted: 2023-01-11 | Resolved: 2023-01-11

## 2023-01-13 ENCOUNTER — NON-APPOINTMENT (OUTPATIENT)
Age: 38
End: 2023-01-13

## 2023-01-13 LAB
APPEARANCE: ABNORMAL
BACTERIA GENITAL AEROBE CULT: NORMAL
BACTERIA: ABNORMAL
BILIRUBIN URINE: NEGATIVE
BLOOD URINE: ABNORMAL
COLOR: YELLOW
GLUCOSE QUALITATIVE U: NEGATIVE
HYALINE CASTS: 2 /LPF
KETONES URINE: NEGATIVE
LEUKOCYTE ESTERASE URINE: ABNORMAL
MICROSCOPIC-UA: NORMAL
NITRITE URINE: NEGATIVE
PH URINE: 6
PROTEIN URINE: ABNORMAL
RED BLOOD CELLS URINE: 2 /HPF
SPECIFIC GRAVITY URINE: 1.03
SQUAMOUS EPITHELIAL CELLS: 15 /HPF
UROBILINOGEN URINE: NORMAL
WHITE BLOOD CELLS URINE: 15 /HPF

## 2023-03-13 ENCOUNTER — OUTPATIENT (OUTPATIENT)
Dept: OUTPATIENT SERVICES | Facility: HOSPITAL | Age: 38
LOS: 1 days | End: 2023-03-13
Payer: COMMERCIAL

## 2023-03-13 VITALS
WEIGHT: 205.03 LBS | SYSTOLIC BLOOD PRESSURE: 117 MMHG | TEMPERATURE: 98 F | OXYGEN SATURATION: 99 % | DIASTOLIC BLOOD PRESSURE: 79 MMHG | HEIGHT: 64.5 IN | RESPIRATION RATE: 15 BRPM | HEART RATE: 70 BPM

## 2023-03-13 DIAGNOSIS — Z98.890 OTHER SPECIFIED POSTPROCEDURAL STATES: Chronic | ICD-10-CM

## 2023-03-13 DIAGNOSIS — E89.0 POSTPROCEDURAL HYPOTHYROIDISM: Chronic | ICD-10-CM

## 2023-03-13 DIAGNOSIS — Z01.818 ENCOUNTER FOR OTHER PREPROCEDURAL EXAMINATION: ICD-10-CM

## 2023-03-13 DIAGNOSIS — N36.1 URETHRAL DIVERTICULUM: ICD-10-CM

## 2023-03-13 DIAGNOSIS — N28.89 OTHER SPECIFIED DISORDERS OF KIDNEY AND URETER: ICD-10-CM

## 2023-03-13 LAB
ANION GAP SERPL CALC-SCNC: 11 MMOL/L — SIGNIFICANT CHANGE UP (ref 5–17)
BUN SERPL-MCNC: 13 MG/DL — SIGNIFICANT CHANGE UP (ref 7–23)
CALCIUM SERPL-MCNC: 9.5 MG/DL — SIGNIFICANT CHANGE UP (ref 8.4–10.5)
CHLORIDE SERPL-SCNC: 102 MMOL/L — SIGNIFICANT CHANGE UP (ref 96–108)
CO2 SERPL-SCNC: 27 MMOL/L — SIGNIFICANT CHANGE UP (ref 22–31)
CREAT SERPL-MCNC: 0.78 MG/DL — SIGNIFICANT CHANGE UP (ref 0.5–1.3)
EGFR: 99 ML/MIN/1.73M2 — SIGNIFICANT CHANGE UP
GLUCOSE SERPL-MCNC: 76 MG/DL — SIGNIFICANT CHANGE UP (ref 70–99)
HCT VFR BLD CALC: 40.3 % — SIGNIFICANT CHANGE UP (ref 34.5–45)
HGB BLD-MCNC: 12.5 G/DL — SIGNIFICANT CHANGE UP (ref 11.5–15.5)
MCHC RBC-ENTMCNC: 27 PG — SIGNIFICANT CHANGE UP (ref 27–34)
MCHC RBC-ENTMCNC: 31 GM/DL — LOW (ref 32–36)
MCV RBC AUTO: 87 FL — SIGNIFICANT CHANGE UP (ref 80–100)
NRBC # BLD: 0 /100 WBCS — SIGNIFICANT CHANGE UP (ref 0–0)
PLATELET # BLD AUTO: 267 K/UL — SIGNIFICANT CHANGE UP (ref 150–400)
POTASSIUM SERPL-MCNC: 4.2 MMOL/L — SIGNIFICANT CHANGE UP (ref 3.5–5.3)
POTASSIUM SERPL-SCNC: 4.2 MMOL/L — SIGNIFICANT CHANGE UP (ref 3.5–5.3)
RBC # BLD: 4.63 M/UL — SIGNIFICANT CHANGE UP (ref 3.8–5.2)
RBC # FLD: 14.9 % — HIGH (ref 10.3–14.5)
SODIUM SERPL-SCNC: 140 MMOL/L — SIGNIFICANT CHANGE UP (ref 135–145)
WBC # BLD: 5.88 K/UL — SIGNIFICANT CHANGE UP (ref 3.8–10.5)
WBC # FLD AUTO: 5.88 K/UL — SIGNIFICANT CHANGE UP (ref 3.8–10.5)

## 2023-03-13 PROCEDURE — 85027 COMPLETE CBC AUTOMATED: CPT

## 2023-03-13 PROCEDURE — G0463: CPT

## 2023-03-13 PROCEDURE — 36415 COLL VENOUS BLD VENIPUNCTURE: CPT

## 2023-03-13 PROCEDURE — 80048 BASIC METABOLIC PNL TOTAL CA: CPT

## 2023-03-13 RX ORDER — SODIUM CHLORIDE 9 MG/ML
3 INJECTION INTRAMUSCULAR; INTRAVENOUS; SUBCUTANEOUS EVERY 8 HOURS
Refills: 0 | Status: DISCONTINUED | OUTPATIENT
Start: 2023-04-03 | End: 2023-04-17

## 2023-03-13 RX ORDER — SODIUM CHLORIDE 9 MG/ML
1000 INJECTION, SOLUTION INTRAVENOUS
Refills: 0 | Status: DISCONTINUED | OUTPATIENT
Start: 2023-04-03 | End: 2023-04-17

## 2023-03-13 NOTE — H&P PST ADULT - HISTORY OF PRESENT ILLNESS
38 y/o F with PMHx of (thyroid cancer as a , s/p thyroidectomy surgery, denies h/o XRT, chemo, last follow up with endocrinologist/oncologist was during childhood, no further follow up was needed).  Left ankle Fx 2022 s/p ORIF at [facility]. 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  Now scheduled for Ureteral diverticulectomy, cystourethroscopy 4/3/23.  Today, patient denies SOB, CP, palpitation, blood in the stool or urine, N/V/D/C, HA, dizziness, seizures. Currently taking Abx Rx by surgeon- currently continues with dyspareunia.     Denies Hx of Covid-19 Infx.          36 y/o F with PMHx of (thyroid cancer as a , s/p thyroidectomy surgery, denies h/o XRT, chemo, last follow up with endocrinologist/oncologist was during childhood, no further follow up was needed).  Left ankle Fx 2022 s/p ORIF at [facility]. Patient had a UTI with dyspareunia and dysuria about 1 month ago, noted with a suburethral mass by GYN seen by urologist and found with a urethral diverticulum.  Now scheduled for Urethral diverticulectomy, cystourethroscopy 4/3/23.  Today, patient denies SOB, CP, palpitation, blood in the stool or urine, N/V/D/C, HA, dizziness, seizures. Currently taking Abx Rx by surgeon- currently continues with dyspareunia.     Denies Hx of Covid-19 Infx.

## 2023-03-13 NOTE — H&P PST ADULT - PROBLEM SELECTOR PLAN 1
Procedure: Ureteral diverticulectomy, cystourethroscopy 4/3/23  PST labs pending  AC: None  Medication changes: None  UCx will be done at surgeon's office 3/14/23 per patient documentation

## 2023-03-22 ENCOUNTER — APPOINTMENT (OUTPATIENT)
Dept: UROGYNECOLOGY | Facility: CLINIC | Age: 38
End: 2023-03-22
Payer: COMMERCIAL

## 2023-03-22 VITALS
BODY MASS INDEX: 35 KG/M2 | DIASTOLIC BLOOD PRESSURE: 71 MMHG | WEIGHT: 205 LBS | SYSTOLIC BLOOD PRESSURE: 105 MMHG | HEIGHT: 64 IN

## 2023-03-22 DIAGNOSIS — N36.1 URETHRAL DIVERTICULUM: ICD-10-CM

## 2023-03-22 PROCEDURE — 51701 INSERT BLADDER CATHETER: CPT

## 2023-03-22 PROCEDURE — 99214 OFFICE O/P EST MOD 30 MIN: CPT | Mod: 25

## 2023-03-22 NOTE — PHYSICAL EXAM
[No Acute Distress] : in no acute distress [Well developed] : well developed [Well Nourished] : ~L well nourished [Good Hygeine] : demonstrates good hygeine [Oriented x3] : oriented to person, place, and time [Normal Memory] : ~T memory was ~L unimpaired [Normal Mood/Affect] : mood and affect are normal [Respirations regular] : ~T respiratory rate was regular [No Edema] : ~T edema was not present [Normal Appearance] : general appearance was normal [Anxiety] : patient is not anxious [Normal] : normal [FreeTextEntry3] : 3 x 4 cm distal midline suburethral mass, no purulent discharge [FreeTextEntry4] : As above 3 x 4 cm anterior vaginal wall mass.

## 2023-03-22 NOTE — DISCUSSION/SUMMARY
New Patient Progress Note      Cc: diabetes     Referring Provider  Omaira Clarke Pa-c  [address] ,  [address]     History of Present Illness:   Oracio Centeno  is a 74 y o  male with a history of type 2 diabetes with long term use of insulin for the last 3-4 years who is presented to establish care with us  He was recently admitted in [address] [facility] for diabetes ketoacidosis and was discharged on basal bolus insulin  He reports diagnosis of type 2 diabetes 3-4 years ago, initially started on metformin and later on Basaglar and Humalog was added to his regimen however as per him that gave him nausea and vomiting and he has started losing weight so he discontinued all of his medication, not checking his blood sugars  Prior to his admission he was extremely weak, unable to get up, losing weight and reports polyuria, polydipsia  He was discharged on Toujeo 40 units in the morning, Humalog 10 units before breakfast lunch and dinner however he states that he takes Humalog before lunch as needed as lunch is his lightest meal   He checks his blood sugars 2 to 3 times a day his fasting blood sugars are ranging from 100-130 occasionally 149  His pre lunch blood sugars are mostly below 120, and before dinner the same as that  He denies any episodes of hypoglycemia  He not following with ophtolmogist or podiatrist   He denies stay of heart attack, stroke, peripheral vascular disease  He has past medical history of pulmonary embolism  Diabetes education: YES  Diet: 3 meals per day, 0 snacks per day  Has hypertension: no   Has hyperlipidemia: followed by PCP; on statin - tolerating well, no myalgias      Thyroid disorders: no  History of pancreatitis: yes     Patient Active Problem List   Diagnosis   • Urinary frequency   • Type 2 diabetes mellitus (HCC)   • Prostate cancer screening   • Microscopic hematuria   • Urge incontinence of urine   • Nocturnal enuresis   • [FreeTextEntry1] : TINY DEL CASTILLO  is a 37 year with urethral diverticula, scheduled for diverticulectomy on 4/3/23. \par \par Risks of procedure including but not limited to the following were discussed: Bleeding, hemorrhage, need for transfusion, infection, damage to surrounding organs (bladder, urethra, nerves, vessels), pain, scar tissue formation, DVT/PE. We also discussed the risk of significant stress urinary incontinence after excision of diverticula given potential of disruption of urethral sphincter mechanism. Discussed possibility of needing additional procedure in the future to treat incontinence. We also discussed the risk of recurrent of diverticula in the future. We discussed likelihood of discharge home with the catheter for 10-14 days.\par \par All questions were answered. She expressed understanding of the above and would like to proceed with the scheduled surgery. I provided pre-operative and post-operative instructions and counseling on expectations. Feeling of incomplete bladder emptying   • Balanitis   • DKA (diabetic ketoacidosis) ([facility])   • New onset atrial fibrillation ([facility])   • Urinary retention   • Fungal infection of skin   • Depressed mood   • Pain in both lower extremities   • Polyuria   • Abdominal hernia   • Hydroureteronephrosis   • Uncontrolled type 2 diabetes mellitus with hyperglycemia ([facility])   • Vitamin D deficiency   • Dyslipidemia      Past Medical History:   Diagnosis Date   • A-fib ([place] [address] )    • Acute metabolic encephalopathy 34/88/8283   • CARLTON (acute kidney injury) ([place] [address] ) 09/04/2022   • Diabetes mellitus ([place] [address] )    • Pancreatitis 09/04/2022   • Sepsis ([place] [address] ) 09/04/2022      Past Surgical History:   Procedure Laterality Date   • KNEE CARTILAGE SURGERY     • KNEE SURGERY Right       Family History   Problem Relation Age of Onset   • Diabetes Father    • Diabetes Mother      Social History     Tobacco Use   • Smoking status: Never Smoker   • Smokeless tobacco: Never Used   Substance Use Topics   • Alcohol use: Never     No Known Allergies      Current Outpatient Medications:   •  [START ON 11/14/2022] ascorbic acid (VITAMIN C) 500 MG tablet, Take 1 tablet (500 mg total) by mouth 2 (two) times a day, Disp: 60 tablet, Rfl: 1  •  docusate sodium (COLACE) 100 mg capsule, Take 1 capsule (100 mg total) by mouth 2 (two) times a day, Disp: 60 capsule, Rfl: 1  •  [START ON 11/14/2022] ferrous sulfate 324 (65 Fe) mg, Take 1 tablet (324 mg total) by mouth 2 (two) times a day before meals, Disp: 60 tablet, Rfl: 1  •  [START ON 62/78/3886] folic acid (FOLVITE) 1 mg tablet, Take 1 tablet (1 mg total) by mouth daily, Disp: 30 tablet, Rfl: 1  •  gabapentin (NEURONTIN) 100 mg capsule, Take 1 capsule (100 mg total) by mouth 3 (three) times a day, Disp: 90 capsule, Rfl: 0  •  Incontinence Supply Disposable (Incontinence Brief Large) MISC, Use every 4 (four) hours as needed (Urinary incontinence), Disp: 36 each, Rfl: 12  •  insulin glargine (Toujeo SoloStar) 300 units/mL CONCENTRATED U-300 injection pen (1-unit dial), Inject 40 Units under the skin every morning, Disp: 4 5 mL, Rfl: 0  •  insulin lispro (HumaLOG KwikPen) 100 units/mL injection pen, Inject 10 Units under the skin 3 (three) times a day with meals, Disp: 15 mL, Rfl: 0  •  insulin lispro (HumaLOG) 100 units/mL injection, Inject 10 Units under the skin 3 (three) times a day with meals, Disp: 20 mL, Rfl: 0  •  Multiple Vitamin (Daily-Octavio Multivitamin) TABS, Take 1 tablet by mouth daily, Disp: , Rfl:   •  [START ON 11/14/2022] Multiple Vitamins-Minerals (multivitamin with minerals) tablet, Take 1 tablet by mouth daily, Disp: 30 tablet, Rfl: 1  •  pantoprazole (PROTONIX) 40 mg tablet, Take 1 tablet (40 mg total) by mouth daily in the early morning, Disp: 30 tablet, Rfl: 0  •  tamsulosin (FLOMAX) 0 4 mg, Take 1 capsule (0 4 mg total) by mouth daily with dinner, Disp: 30 capsule, Rfl: 12  •  glucose 4 g chewable tablet, Chew 4 tablets (16 g total) once for 1 dose, Disp: 4 tablet, Rfl: 0  Review of Systems   Constitutional: Positive for fatigue and unexpected weight change  Negative for activity change, appetite change, chills, diaphoresis and fever  HENT: Negative for congestion, drooling, ear discharge, ear pain, trouble swallowing and voice change  Eyes: Negative for photophobia, pain, discharge, redness, itching and visual disturbance  Respiratory: Negative for cough, chest tightness, shortness of breath and wheezing  Cardiovascular: Negative for chest pain, palpitations and leg swelling  Gastrointestinal: Positive for constipation  Negative for abdominal distention, abdominal pain, blood in stool, diarrhea, nausea and vomiting  Endocrine: Positive for cold intolerance  Negative for heat intolerance, polydipsia, polyphagia and polyuria  Genitourinary: Negative for dysuria, flank pain, frequency and hematuria     Musculoskeletal: Negative for back pain, gait problem, joint swelling, myalgias, neck pain and neck stiffness  Skin: Negative for color change, pallor, rash and wound  Neurological: Negative for dizziness, tremors, seizures, syncope, speech difficulty, weakness, numbness and headaches  Psychiatric/Behavioral: Positive for sleep disturbance  Negative for agitation  All other systems reviewed and are negative  Physical Exam:  Body mass index is 30 66 kg/m²  /74 (BP Location: Left arm, Patient Position: Sitting, Cuff Size: Standard)   Pulse 68   Temp 98 1 °F (36 7 °C) (Temporal)   Ht 6' 1" (1 854 m)   Wt 105 kg (232 lb 6 4 oz)   SpO2 97%   BMI 30 66 kg/m²    Wt Readings from Last 3 Encounters:   10/17/22 105 kg (232 lb 6 4 oz)   10/07/22 107 kg (235 lb)   10/03/22 107 kg (235 lb 9 6 oz)       GEN: NAD  E/n/m nl facies, hearing intact bilat, tongue midline, lips nl  Eyes: no stare or proptosis, nl lids and conjunctiva, EOMI  Neck: trachea midline, thyroid NT to palpation, nl in size, no nodules or neck masses noted, no cervical LAD  CV; heart reg rate s1s2 nl, no m/r/g appreciated, no REBECA  Resp: CTAB, good effort  Ab+BS  Neuro: no tremor, 2+ DTRs in BUE  MS: no c/c in digits, moves all 4 ext, nl muscle bulk, gait nl  Skin: warm and dry, no palmar erythema  Ext: no c/c in digits, no edema bilaterally, 2+ DP and PT pulses bilat,   Psych: nl mood and affect, no gross lapses in memory    Patient's shoes and socks removed  Right Foot/Ankle   Right Foot Inspection  Skin Exam: skin normal  Skin not intact, no dry skin, no warmth, no callus, no erythema, no maceration, no abnormal color, no pre-ulcer, no ulcer and no callus  Toe Exam: No swelling, no tenderness, erythema and  no right toe deformity    Sensory   Vibration: intact  Proprioception: intact  Monofilament testing: intact    Vascular  Capillary refills: < 3 seconds  The right DP pulse is 2+  The right PT pulse is 2+       Left Foot/Ankle  Left Foot Inspection  Skin Exam: skin normal  Skin not intact, no dry skin, no warmth, no erythema, no maceration, normal color, no pre-ulcer, no ulcer and no callus  Toe Exam: No swelling, no tenderness, no erythema and no left toe deformity  Sensory   Vibration: intact  Proprioception: intact  Monofilament testing: intact    Vascular  Capillary refills: < 3 seconds  The left DP pulse is 2+  The left PT pulse is 2+  Assign Risk Category  No deformity present  No loss of protective sensation  No weak pulses  Risk: 0      Labs:   No components found for: HA1C  No components found for: GLU    Lab Results   Component Value Date    CREATININE 0 93 09/20/2022    CREATININE 0 83 09/16/2022    CREATININE 1 26 09/14/2022    BUN 18 09/20/2022    K 3 9 09/20/2022     09/20/2022    CO2 27 09/20/2022     eGFR   Date Value Ref Range Status   09/20/2022 84 ml/min/1 73sq m Final     No components found for: [facility]    Lab Results   Component Value Date    HDL 54 09/04/2022    TRIG 92 09/04/2022       Lab Results   Component Value Date    ALT 14 09/16/2022    AST 33 09/16/2022    ALKPHOS 84 09/16/2022       No results found for: TSH, FREET4, TSI    Impression:  1  Uncontrolled type 2 diabetes mellitus with hyperglycemia ([address] )    2  Vitamin D deficiency    3  Dyslipidemia       Plan:    Oracio was seen today for consult  Diagnoses and all orders for this visit:    Uncontrolled insulin dependent diabetes mellitus with hyperglycemia [facility]):    Poorly controlled diabetes with most recent A1c more than 14%, patient was not any medication prior to his recent admission for diabetes ketoacidosis  Was discharged on Toujeo 40 units q a m  and Humalog 10 units t i d  a c  however he takes just before breakfast and dinner  His self-monitoring blood glucose showed reasonable diabetes control with the fasting blood sugars ranging from 100-120 and postprandial are blood sugars are below 120  No hypoglycemia    I recommended to take 8 units Humalog 15-20 minutes before each meal     Continue Toujeo at current dose  Continue to check his blood sugars 2 times a day and send me his logs in 2-3 weeks for further adjustment  He is interested in continuous glucose monitoring and process started  He will get education for CGM he received them  I discussed with the patient regarding the rules of 15 for management of hypoglycemia,   I referred him to podiatrist and ophthalmologist     return back to the office in 3 months  Check labs immediately before next visit  As patient has history of diabetes ketoacidosis will check for islet antibodies, if they are negative patient may have ketone prone type 2 diabetes  He is not a candidate for SGLT-2 inhibitors due to DKA and GLP-1 agonist due to pancreatitis  -     Ambulatory Referral to Endocrinology  -     docusate sodium (COLACE) 100 mg capsule; Take 1 capsule (100 mg total) by mouth 2 (two) times a day  -     Comprehensive metabolic panel Lab Collect; Future  -     HEMOGLOBIN A1C W/ EAG ESTIMATION Lab Collect; Future  -     Vitamin D 25 hydroxy Lab Collect; Future  -     T4, free Lab Collect; Future  -     TSH, 3rd generation Lab Collect; Future  -     Microalbumin / creatinine urine ratio Lab Collect; Future  -     Ambulatory referral to Podiatry; Future  -     Ambulatory referral to Ophthalmology; Future  -     IA2 Autoantibodies Lab Collect; Future  -     Glutamic acid decarboxylase Lab Collect; Future  -     Anti-islet cell antibody; Future  -     C-peptide Lab Collect; Future      Vitamin D deficiency  -     Vitamin D 25 hydroxy Lab Collect; Future    - dyslipidemia:  Patient has LDL of 80 considering patient's age older than 36 and his LDL ranging from 80 to 160s a candidate for adult intermediate intensity statins which we discussed next time when received new lab results  Patient's blood  Pressure is at goal however if that showed microalbuminuria we will start low-dose ACE inhibitor or ARBs      Discussed with the patient and all questioned fully answered  He will call me if any problems arise  Counseled patient on diagnostic results, prognosis, risk and benefit of treatment options, instruction for management, importance of treatment compliance, Risk  factor reduction and impressions    I have spent 50 minutes with Patient  today in which greater than 50% of this time was spent in counseling/coordination of care regarding Prognosis, Intructions for management, Importance of tx compliance and Risk factor reductions            Archana Villa MD

## 2023-03-22 NOTE — HISTORY OF PRESENT ILLNESS
[FreeTextEntry1] : Allyson is a 37 year old with urethral diverticulum here for preop visit for urethral diverticulectomy. No significant REJI or other urinary complaints. She is s/p antibiotic therapy for infection given purulent drainage from diverticulum. \par \par Preop workup:\par - MRI: two adjacent posterior urethral diverticula at 6 and 7 o'clock measuring 0.6 cm x 0.8 cm x 1.0 cm and 0.8 cm x 1.6 cm x 0.9 cm respectively (see scanned document).

## 2023-03-22 NOTE — PROCEDURE
[FreeTextEntry1] : A sterile straight catheterization was performed to collect urine sample for preoperative urine culture.

## 2023-03-24 LAB — BACTERIA UR CULT: NORMAL

## 2023-04-02 ENCOUNTER — TRANSCRIPTION ENCOUNTER (OUTPATIENT)
Age: 38
End: 2023-04-02

## 2023-04-03 ENCOUNTER — RESULT REVIEW (OUTPATIENT)
Age: 38
End: 2023-04-03

## 2023-04-03 ENCOUNTER — OUTPATIENT (OUTPATIENT)
Dept: OUTPATIENT SERVICES | Facility: HOSPITAL | Age: 38
LOS: 1 days | End: 2023-04-03
Payer: COMMERCIAL

## 2023-04-03 ENCOUNTER — TRANSCRIPTION ENCOUNTER (OUTPATIENT)
Age: 38
End: 2023-04-03

## 2023-04-03 ENCOUNTER — APPOINTMENT (OUTPATIENT)
Dept: UROGYNECOLOGY | Facility: HOSPITAL | Age: 38
End: 2023-04-03
Payer: COMMERCIAL

## 2023-04-03 VITALS
DIASTOLIC BLOOD PRESSURE: 57 MMHG | TEMPERATURE: 97 F | RESPIRATION RATE: 14 BRPM | SYSTOLIC BLOOD PRESSURE: 107 MMHG | OXYGEN SATURATION: 100 % | HEART RATE: 75 BPM

## 2023-04-03 VITALS
HEIGHT: 64.5 IN | DIASTOLIC BLOOD PRESSURE: 76 MMHG | HEART RATE: 66 BPM | TEMPERATURE: 98 F | SYSTOLIC BLOOD PRESSURE: 114 MMHG | RESPIRATION RATE: 16 BRPM | WEIGHT: 205.03 LBS | OXYGEN SATURATION: 99 %

## 2023-04-03 DIAGNOSIS — N36.1 URETHRAL DIVERTICULUM: ICD-10-CM

## 2023-04-03 DIAGNOSIS — N32.89 OTHER SPECIFIED DISORDERS OF BLADDER: ICD-10-CM

## 2023-04-03 DIAGNOSIS — E89.0 POSTPROCEDURAL HYPOTHYROIDISM: Chronic | ICD-10-CM

## 2023-04-03 DIAGNOSIS — Z98.890 OTHER SPECIFIED POSTPROCEDURAL STATES: Chronic | ICD-10-CM

## 2023-04-03 PROCEDURE — 53230 REMOVAL OF URETHRA LESION: CPT

## 2023-04-03 PROCEDURE — C9399: CPT

## 2023-04-03 PROCEDURE — 88305 TISSUE EXAM BY PATHOLOGIST: CPT

## 2023-04-03 PROCEDURE — 88305 TISSUE EXAM BY PATHOLOGIST: CPT | Mod: 26

## 2023-04-03 RX ORDER — OXYCODONE HYDROCHLORIDE 5 MG/1
5 TABLET ORAL ONCE
Refills: 0 | Status: DISCONTINUED | OUTPATIENT
Start: 2023-04-03 | End: 2023-04-03

## 2023-04-03 RX ORDER — DIPHENHYDRAMINE HCL 50 MG
12.5 CAPSULE ORAL ONCE
Refills: 0 | Status: DISCONTINUED | OUTPATIENT
Start: 2023-04-03 | End: 2023-04-17

## 2023-04-03 RX ORDER — OXYCODONE HYDROCHLORIDE 5 MG/1
1 TABLET ORAL
Qty: 6 | Refills: 0
Start: 2023-04-03

## 2023-04-03 RX ORDER — HYDROMORPHONE HYDROCHLORIDE 2 MG/ML
0.25 INJECTION INTRAMUSCULAR; INTRAVENOUS; SUBCUTANEOUS
Refills: 0 | Status: DISCONTINUED | OUTPATIENT
Start: 2023-04-03 | End: 2023-04-03

## 2023-04-03 RX ORDER — CEFDINIR 250 MG/5ML
1 POWDER, FOR SUSPENSION ORAL
Qty: 0 | Refills: 0 | DISCHARGE

## 2023-04-03 RX ORDER — CEFAZOLIN SODIUM 1 G
2000 VIAL (EA) INJECTION ONCE
Refills: 0 | Status: COMPLETED | OUTPATIENT
Start: 2023-04-03 | End: 2023-04-03

## 2023-04-03 RX ORDER — ONDANSETRON 8 MG/1
4 TABLET, FILM COATED ORAL ONCE
Refills: 0 | Status: DISCONTINUED | OUTPATIENT
Start: 2023-04-03 | End: 2023-04-17

## 2023-04-03 RX ORDER — LIDOCAINE HCL 20 MG/ML
0.2 VIAL (ML) INJECTION ONCE
Refills: 0 | Status: COMPLETED | OUTPATIENT
Start: 2023-04-03 | End: 2023-04-03

## 2023-04-03 RX ADMIN — SODIUM CHLORIDE 100 MILLILITER(S): 9 INJECTION, SOLUTION INTRAVENOUS at 09:10

## 2023-04-03 RX ADMIN — SODIUM CHLORIDE 3 MILLILITER(S): 9 INJECTION INTRAMUSCULAR; INTRAVENOUS; SUBCUTANEOUS at 09:04

## 2023-04-03 NOTE — ASU PATIENT PROFILE, ADULT - REASON FOR ADMISSION, PROFILE
12/8/2021         RE: Holly Wood  [address] [facility] [address]        Dear Colleague,    Thank you for referring your patient, Holly Wood, to the [facility] [place]. Please see a copy of my visit note below.    Assessment:   Holly Wood (AKKALPANA Serrato) is a 71 year old y.o. female with past medical history significant for macular degeneration who presents today for follow-up regarding chronic bilateral low back pain secondary to sacroiliac joint pain.  She is status post bilateral SI joint injections on 11-23-21.  She is reporting 80% relief following these injections.  She is now reporting return of her chronic bilateral knee pain secondary to primary osteoarthritis of the knees.  She has mild manageable low back pain.  She continues to have multiple areas of osteopathic somatic dysfunction, which are listed below.  She remains without any red flag symptoms.      PSP:  Dr. Gao (Transfer of care from [facility])       Plan:     A shared decision making plan was used.  The patient's values and choices were respected.  The following represents what was discussed and decided upon by the physician and the patient.      1.  DIAGNOSTIC TESTS:  No further diagnostic tests are necessary at this time.  She does not have any radicular or red flag symptoms that would necessitate MRI imaging.  2.  PHYSICAL THERAPY: No further physical therapy at this time.  The patient has previously completed physical therapy in the fall of 2020 and is encouraged to continue doing the home exercise program daily.    3.  MEDICATIONS:  No changes to the medications today.    -She can continue with gabapentin 300 mg at bedtime.  -She can continue with Elavil 25 mg at bedtime.  -She can continue with over-the-counter aspirin.  4.  INTERVENTIONS:  She is deemed appropriate for continued osteopathic manipulation..  Osteopathic manipulation was performed to 7 areas today.  The patient tolerated  the treatment well and she reported relief immediately afterwards.   Please see below for the osteopathic manipulation that was performed today.  -I did place an order for bilateral intra-articular knee joint injections with corticosteroid.  She has had good relief with these in the past.  These will be performed under ultrasound guidance with either Dr. Beltre or Dr. Warren.  - She is status post bilateral SI joint injections on 11-23-21.  5.  PATIENT EDUCATION:    -She states that she did not get the tool that was recommended the last time because she could not remember the name of it.  I did remind her that this is called an occipital release tool.  She can purchase one online.  They are usually very inexpensive.  - The patient was advised to rest today and drink plenty of water.  Normal activities can be resumed as tolerated tomorrow.    - The patient was told that soreness following the treatment is normal and should improve within a few days.  Ice should be used (as opposed to heat) if soreness occurs.  If the soreness is concerning, the clinic should be notified so further instructions can be given.  -All of her questions were answered to her satisfaction today.  She was in agreement the treatment plan.  6.  FOLLOW-UP: The patient is asked to follow-up in 4 weeks.. If there are any questions/concerns or any significant worsening of pain prior to that time, the patient is asked to call the clinic via the nurse navigation line or via [employer].     Subjective:     Holly Wood (AKA Ama) is a 71 year old female who presents today for follow-up regarding chronic bilateral low back pain secondary to sacroiliac joint pain.  She is status post bilateral sacroiliac joint injections on November 23 of 2021.  She is reporting 80% relief following these injections.  She does have a mild amount of low back pain, mainly over the sacrum.  She denies any radiation of pain going down into the legs.  No numbness  tingling or weakness in the legs.  She is reporting return of her bilateral knee pain.  She reports that both knees are pretty much equally painful.  She rates her overall pain today at a 2 out of 10.  At worst is an 8 out of 10.  Best is a 2 out of 10.  Pain is worse with lifting, walking too far, standing too long.  She does feel better with doing her exercises and stretches recommended by her physical therapist.  Her aspirin and gabapentin are helpful.  She is wondering about a repeat injection for her knees, as this has worked quite well for her in the past.    Past medical history is reviewed and is unchanged in the interim.    Family history is reviewed and is unchanged in the interim.    Review of Systems:  Pertinent negatives include no numbness, tingling, weakness, headaches, fevers, chills, unexplained weight loss, bowel incontinence, bladder incontinence, trips, stumbles, falls.  All others reviewed and are negative.     Objective:   CONSTITUTIONAL:  Vital signs as above.  No acute distress.  The patient is well nourished and well groomed.    PSYCHIATRIC:  The patient is awake, alert, oriented to person, place and time.  The patient is answering questions appropriately with clear speech.  Normal affect.  SKIN:  Skin over the face, posterior torso, bilateral upper and lower extremities is clean, dry, intact without rashes.  MUSCULOSKELETAL:  Gait is non-antalgic.  The patient is able to transfer independently.      OSTEOPATHIC STRUCTURAL EXAM:  Negative standing flexion test.  Symmetric iliac crest.  Lumbar spine: L4-5 flex, rotated/side bent left  Sacrum: Right unilateral sacral flexion, ligament tenderness over the apex of the sacrum bilaterally  Innominates/Pelvis: Anterior/inferior right, posterior/superior left  Thoracic spine: T10-12 flexed, rotated/side bent right  Rib cage: First rib elevated on the left  Cervical spine: C2-3 flexed, rotated/side bent right  Head/OA joint: Side bent right/rotated  left    OMT:  TREATMENTS IN PARENTHESES  Lumbar spine: L4-5 flex, rotated/side bent left  (Muscle energy, patient in lateral recumbent)  Sacrum: Right unilateral sacral flexion (Myofascial release, patient prone)., ligament tenderness over the apex of the sacrum bilaterally (balance ligamentous tension, patient prone)  Innominates/Pelvis: Anterior/inferior right, posterior/superior left  (Muscle energy with the patient supine).  Thoracic spine: T10-12 flexed, rotated/side bent right (Muscle energy, patient seated)  Rib cage: First rib elevated on the left (Myofascial release, patient supine)  Cervical spine: C2-3 flexed, rotated/side bent right  (Muscle energy with the patient supine).  Head/OA joint: Side bent right/rotated left (Myofascial release, patient supine)        Again, thank you for allowing me to participate in the care of your patient.        Sincerely,        Sylvia Young, DO     Urethral surgery "

## 2023-04-03 NOTE — ASU DISCHARGE PLAN (ADULT/PEDIATRIC) - CARE PROVIDER_API CALL
[place])  Obstetrics and Gynecology  [address] [place] [address]  Phone: [phone]  Fax: [phone]  Established Patient  Follow Up Time: 2 weeks

## 2023-04-03 NOTE — CHART NOTE - NSCHARTNOTEFT_GEN_A_CORE
Patient evaluated after surgery. She is doing well. Vaginal packing x 1 removed with scant bleeding noted on packing. Akins catheter teaching performed without issue. Postoperative instructions were provided to patient. All questions answered. Pt instructed to call office if any further questions/concerns arise.

## 2023-04-03 NOTE — BRIEF OPERATIVE NOTE - OPERATION/FINDINGS
2 urethral diverticulum were noted which were adjacent to each other and about 1cm in size each. The os of the diverticulum was noted visually with cystoscopy as well. Once removed, a 1cm urethral opening was apparent and closed in a water-tight fashion. 16F gamboa left in place.

## 2023-04-03 NOTE — ASU DISCHARGE PLAN (ADULT/PEDIATRIC) - NS MD DC FALL RISK RISK
For information on Fall & Injury Prevention, visit: [email or web address] OR  [email or web address] OR  https://www.cdc.gov/steadi/patient.html

## 2023-04-03 NOTE — ASU PATIENT PROFILE, ADULT - FALL HARM RISK - UNIVERSAL INTERVENTIONS
Bed in lowest position, wheels locked, appropriate side rails in place/Call bell, personal items and telephone in reach/Instruct patient to call for assistance before getting out of bed or chair/Non-slip footwear when patient is out of bed/[place] to call system/Physically safe environment - no spills, clutter or unnecessary equipment/Purposeful Proactive Rounding/Room/bathroom lighting operational, light cord in reach

## 2023-04-03 NOTE — ASU DISCHARGE PLAN (ADULT/PEDIATRIC) - NURSING INSTRUCTIONS
You received a dose of Tylenol today at 1:45 PM. If needed, next dose time is 7:45 PM this evening. Do not exceed 4,000 mg of Tylenol in a 24 hour period. You received a dose of Toradol (motrin or ibuprofen) today at 2:45 PM. If needed, next dose time is 8:45 PM this evening. Please eat before taking this medication.

## 2023-04-03 NOTE — ASU PREOP CHECKLIST - AS TEMP SITE
What Type Of Note Output Would You Prefer (Optional)?: Bullet Format How Severe Are Your Spot(S)?: mild Have Your Spot(S) Been Treated In The Past?: has not been treated Hpi Title: Evaluation of Skin Lesions forehead

## 2023-04-03 NOTE — PRE-ANESTHESIA EVALUATION ADULT - NSANTHBMIRD_ENT_A_CORE
December 7, 2020      Chuck Villalobos  [address] [address]          Dear Mr. Villalobos:    Chris Mcconnell MD has ordered a colonoscopy for you and we would like to schedule that procedure.     Please call Open Access Scheduling Patient Line [phone] at your earliest convenience. Let the  know that your paperwork is started, and that you are calling to arrange a date and time for the procedure.      If you have any questions or concerns, we would be more than happy to discuss them with you. We look forward to assisting you with your health care needs.      Sincerely,  [facility] Scheduling Patient Line [phone]  Surgery Scheduler for Open [facility] Colonoscopy Scheduling  [facility] Pre-Admit Department  
No

## 2023-04-05 ENCOUNTER — NON-APPOINTMENT (OUTPATIENT)
Age: 38
End: 2023-04-05

## 2023-04-05 PROBLEM — N32.89 BLADDER SPASM: Status: ACTIVE | Noted: 2023-04-05

## 2023-04-05 RX ORDER — OXYBUTYNIN CHLORIDE 10 MG/1
10 TABLET, EXTENDED RELEASE ORAL
Qty: 30 | Refills: 3 | Status: ACTIVE | COMMUNITY
Start: 2023-04-05 | End: 1900-01-01

## 2023-04-09 ENCOUNTER — EMERGENCY (EMERGENCY)
Facility: HOSPITAL | Age: 38
LOS: 1 days | Discharge: HOME | End: 2023-04-09
Attending: EMERGENCY MEDICINE
Payer: COMMERCIAL

## 2023-04-09 VITALS
RESPIRATION RATE: 16 BRPM | TEMPERATURE: 98 F | SYSTOLIC BLOOD PRESSURE: 125 MMHG | WEIGHT: 210.1 LBS | OXYGEN SATURATION: 97 % | HEART RATE: 96 BPM | HEIGHT: 64.5 IN | DIASTOLIC BLOOD PRESSURE: 84 MMHG

## 2023-04-09 DIAGNOSIS — E89.0 POSTPROCEDURAL HYPOTHYROIDISM: Chronic | ICD-10-CM

## 2023-04-09 DIAGNOSIS — Z98.890 OTHER SPECIFIED POSTPROCEDURAL STATES: Chronic | ICD-10-CM

## 2023-04-09 PROCEDURE — 99284 EMERGENCY DEPT VISIT MOD MDM: CPT

## 2023-04-10 LAB
APPEARANCE UR: ABNORMAL
BACTERIA # UR AUTO: NEGATIVE — SIGNIFICANT CHANGE UP
BILIRUB UR-MCNC: NEGATIVE — SIGNIFICANT CHANGE UP
COLOR SPEC: YELLOW — SIGNIFICANT CHANGE UP
DIFF PNL FLD: ABNORMAL
EPI CELLS # UR: 2 /HPF — SIGNIFICANT CHANGE UP
GLUCOSE UR QL: NEGATIVE — SIGNIFICANT CHANGE UP
HCG UR QL: NEGATIVE — SIGNIFICANT CHANGE UP
HYALINE CASTS # UR AUTO: 2 /LPF — SIGNIFICANT CHANGE UP (ref 0–2)
KETONES UR-MCNC: NEGATIVE — SIGNIFICANT CHANGE UP
LEUKOCYTE ESTERASE UR-ACNC: ABNORMAL
NITRITE UR-MCNC: NEGATIVE — SIGNIFICANT CHANGE UP
PH UR: 6 — SIGNIFICANT CHANGE UP (ref 5–8)
PROT UR-MCNC: ABNORMAL
RBC CASTS # UR COMP ASSIST: 779 /HPF — HIGH (ref 0–4)
SP GR SPEC: 1.03 — HIGH (ref 1.01–1.02)
UROBILINOGEN FLD QL: NEGATIVE — SIGNIFICANT CHANGE UP
WBC UR QL: 21 /HPF — HIGH (ref 0–5)

## 2023-04-10 PROCEDURE — 81025 URINE PREGNANCY TEST: CPT

## 2023-04-10 PROCEDURE — 87086 URINE CULTURE/COLONY COUNT: CPT

## 2023-04-10 PROCEDURE — 99283 EMERGENCY DEPT VISIT LOW MDM: CPT

## 2023-04-10 PROCEDURE — 87077 CULTURE AEROBIC IDENTIFY: CPT

## 2023-04-10 PROCEDURE — 51798 US URINE CAPACITY MEASURE: CPT

## 2023-04-10 PROCEDURE — 81001 URINALYSIS AUTO W/SCOPE: CPT

## 2023-04-10 PROCEDURE — 87186 SC STD MICRODIL/AGAR DIL: CPT

## 2023-04-10 NOTE — ED PROVIDER NOTE - PHYSICAL EXAMINATION
GENERAL: Not in acute distress, non-toxic appearing  HEAD: normocephalic, atraumatic  HEENT: EOMI, normal conjunctiva, oral mucosa moist, neck supple  CARDIAC: regular rate and rhythm, normal S1 and S2,  no appreciable murmurs  PULM: clear to ascultation bilaterally, no crackles, rales, rhonchi, or wheezing  GI: abdomen nondistended, soft, nontender, + mild abd pressure with plapation no pain no guarding or rebound tenderness  : No CVA tenderness, no suprapubic tenderness  NEURO: alert and oriented x 3, normal speech, no focal motor or sensory deficits, gait normal, no gross neurologic deficit  MSK: No visible deformities, no peripheral edema, calf tenderness/redness/swelling  SKIN: No visible rashes, dry, well-perfused  PSYCH: appropriate mood and affect

## 2023-04-10 NOTE — ED PROVIDER NOTE - ATTENDING CONTRIBUTION TO CARE
Afebrile. Awake and Alert. Abdomen soft NTND. CN II-XII grossly intact. Moves all extremities without lateralization. Clear light red urine with mild precipitate in Akins.

## 2023-04-10 NOTE — ED PROVIDER NOTE - NSFOLLOWUPINSTRUCTIONS_ED_ALL_ED_FT
You were seen in the ED for your complications with your Akins Catheter. Your urine analysis showed some blood and some white blood cells but this is to be expected from your procedure. Please follow up with your Urology/Gyn doctor. Continue to take tylenol for your pain.     Rest, drink plenty of fluids.  Advance activity as tolerated.  Continue all previously prescribed medications as directed.  Follow up with your primary care physician in 48-72 hours- bring copies of your results.  Return to the ER for worsening or persistent symptoms, and/or ANY NEW OR CONCERNING SYMPTOMS. If you have issues obtaining follow up, please call: [phone] to obtain a doctor or specialist who takes your insurance in your area.

## 2023-04-10 NOTE — ED PROVIDER NOTE - PROGRESS NOTE DETAILS
Pt is not having any abd pain or any other symptoms at this time. Reviewed her UA and discussed return precautions. She feels comfortable being DC home to follow up with her Uro/Gyn

## 2023-04-10 NOTE — ED PROVIDER NOTE - PATIENT PORTAL LINK FT
You can access the FollowMyHealth Patient Portal offered by [facility] by registering at the following website: [email or web address]. By joining [employer]’s FollowMyHealth portal, you will also be able to view your health information using other applications (apps) compatible with our system.

## 2023-04-10 NOTE — ED PROVIDER NOTE - CLINICAL SUMMARY MEDICAL DECISION MAKING FREE TEXT BOX
38 year old female s/p urethral diverticulectomy 1 week ago with a gamboa catch placed comes into the ED for eval of complications from her gamboa cath. She states  she has had blood/clots in the urine bag as well as bladder spasms and she has been leaking urine out the sides of the cath. Her abd pain has been well controlled with tylenol. Gamboa appears to be in the correct place on POCUS in ED. Will get a UA and likely dc pt to follow up with uro/gyn.

## 2023-04-10 NOTE — ED PROVIDER NOTE - OBJECTIVE STATEMENT
38 year old female s/p urethral diverticulectomy 1 week ago with a gamboa catch placed comes into the ED for eval of complications from her gamboa cath. She states  she has had blood/clots in the urine bag as well as bladder spasms and she has been leaking urine out the sides of the cath for the last 1 week. She was seen by her uro/gyn doc who gave her some oxybutynin but it has not been helpful. She has been taking tylenol for her abd pain which has been enough. She last took it 6 hours ago and she does not feel much abd pain currently. She denies any fevers, chills, pain with urination, flank pain.

## 2023-04-12 ENCOUNTER — RESULT REVIEW (OUTPATIENT)
Age: 38
End: 2023-04-12

## 2023-04-12 LAB
-  AMIKACIN: SIGNIFICANT CHANGE UP
-  AZTREONAM: SIGNIFICANT CHANGE UP
-  CEFEPIME: SIGNIFICANT CHANGE UP
-  CEFTAZIDIME: SIGNIFICANT CHANGE UP
-  CIPROFLOXACIN: SIGNIFICANT CHANGE UP
-  GENTAMICIN: SIGNIFICANT CHANGE UP
-  IMIPENEM: SIGNIFICANT CHANGE UP
-  LEVOFLOXACIN: SIGNIFICANT CHANGE UP
-  MEROPENEM: SIGNIFICANT CHANGE UP
-  PIPERACILLIN/TAZOBACTAM: SIGNIFICANT CHANGE UP
-  TOBRAMYCIN: SIGNIFICANT CHANGE UP
CULTURE RESULTS: SIGNIFICANT CHANGE UP
METHOD TYPE: SIGNIFICANT CHANGE UP
ORGANISM # SPEC MICROSCOPIC CNT: SIGNIFICANT CHANGE UP
ORGANISM # SPEC MICROSCOPIC CNT: SIGNIFICANT CHANGE UP
SPECIMEN SOURCE: SIGNIFICANT CHANGE UP
SURGICAL PATHOLOGY STUDY: SIGNIFICANT CHANGE UP

## 2023-04-12 RX ORDER — CIPROFLOXACIN LACTATE 400MG/40ML
1 VIAL (ML) INTRAVENOUS
Qty: 14 | Refills: 0
Start: 2023-04-12 | End: 2023-04-18

## 2023-04-12 NOTE — ED POST DISCHARGE NOTE - DETAILS
4/12: on cefdinir per uro/gyn advised switch to cipro. patient with scheduled outpatient follow up monday and states she will discuss with her provider at that time - Rosario Ellington PA-C

## 2023-04-17 ENCOUNTER — APPOINTMENT (OUTPATIENT)
Dept: UROGYNECOLOGY | Facility: CLINIC | Age: 38
End: 2023-04-17
Payer: COMMERCIAL

## 2023-04-17 ENCOUNTER — OUTPATIENT (OUTPATIENT)
Dept: OUTPATIENT SERVICES | Facility: HOSPITAL | Age: 38
LOS: 1 days | End: 2023-04-17

## 2023-04-17 ENCOUNTER — NON-APPOINTMENT (OUTPATIENT)
Age: 38
End: 2023-04-17

## 2023-04-17 ENCOUNTER — APPOINTMENT (OUTPATIENT)
Dept: RADIOLOGY | Facility: HOSPITAL | Age: 38
End: 2023-04-17
Payer: COMMERCIAL

## 2023-04-17 VITALS
DIASTOLIC BLOOD PRESSURE: 71 MMHG | SYSTOLIC BLOOD PRESSURE: 112 MMHG | WEIGHT: 205 LBS | HEIGHT: 64 IN | BODY MASS INDEX: 35 KG/M2

## 2023-04-17 DIAGNOSIS — Z98.890 OTHER SPECIFIED POSTPROCEDURAL STATES: Chronic | ICD-10-CM

## 2023-04-17 DIAGNOSIS — N36.1 URETHRAL DIVERTICULUM: ICD-10-CM

## 2023-04-17 PROCEDURE — 74430 CONTRAST X-RAY BLADDER: CPT | Mod: 26

## 2023-04-17 PROCEDURE — 99024 POSTOP FOLLOW-UP VISIT: CPT

## 2023-04-17 RX ORDER — PHENAZOPYRIDINE HYDROCHLORIDE 100 MG/1
100 TABLET ORAL 3 TIMES DAILY
Qty: 9 | Refills: 0 | Status: ACTIVE | COMMUNITY
Start: 2023-04-17 | End: 1900-01-01

## 2023-04-17 NOTE — SUBJECTIVE
[FreeTextEntry1] : Doing well, complaining mainly of discomfort from the Akins catheter and bladder spasms [FreeTextEntry8] : Trospium for bladder spasms [FreeTextEntry7] : Due to Akins catheter [FreeTextEntry6] : Normal [FreeTextEntry5] : Akins catheter in place [FreeTextEntry4] : Normal [FreeTextEntry3] : Normal [FreeTextEntry2] : DIfficult due to discomfort from catheter

## 2023-04-17 NOTE — OBJECTIVE
[Clean, Dry, Intact] : Clean, Dry, Intact [Healing well] : healing well [FreeTextEntry3] : Sutures in place, incision intact

## 2023-04-17 NOTE — DISCUSSION/SUMMARY
[Post-Op instructions given. Pt/family verbalizes understanding] : post-operative instructions were provided to the patient/family who verbalize understanding [Risks/Benefits discussed. Pt/family verbalizes understanding] : risks and benefits of the procedure were discussed with the patient/family who verbalize understanding [FreeTextEntry1] : Doing well post-op, except for bladder spasms causing urgency and discomfort despite oxybutynin. She was seen in the ED 8 days ago for this and urine culture collected noted to be growing pseudomonas, she was given a course of Cipro by ED which she completed. On exam, incision healing well, clear urine noted in Akins catheter. Akins balloon deflated by 50% in attempt to improve patient's comfort and decrease spasms. Will obtain VCUG today to assess for adequate healing of urethral incision and determine timing of catheter removal. RTO in 2 days after VCUG.

## 2023-04-19 ENCOUNTER — OUTPATIENT (OUTPATIENT)
Dept: OUTPATIENT SERVICES | Facility: HOSPITAL | Age: 38
LOS: 1 days | End: 2023-04-19

## 2023-04-19 ENCOUNTER — APPOINTMENT (OUTPATIENT)
Dept: RADIOLOGY | Facility: HOSPITAL | Age: 38
End: 2023-04-19
Payer: COMMERCIAL

## 2023-04-19 ENCOUNTER — APPOINTMENT (OUTPATIENT)
Dept: UROGYNECOLOGY | Facility: CLINIC | Age: 38
End: 2023-04-19
Payer: COMMERCIAL

## 2023-04-19 VITALS
BODY MASS INDEX: 35 KG/M2 | WEIGHT: 205 LBS | SYSTOLIC BLOOD PRESSURE: 104 MMHG | HEART RATE: 54 BPM | DIASTOLIC BLOOD PRESSURE: 64 MMHG | HEIGHT: 64 IN

## 2023-04-19 DIAGNOSIS — Z98.890 OTHER SPECIFIED POSTPROCEDURAL STATES: Chronic | ICD-10-CM

## 2023-04-19 DIAGNOSIS — E89.0 POSTPROCEDURAL HYPOTHYROIDISM: Chronic | ICD-10-CM

## 2023-04-19 DIAGNOSIS — N36.1 URETHRAL DIVERTICULUM: ICD-10-CM

## 2023-04-19 PROCEDURE — 74455 X-RAY URETHRA/BLADDER: CPT | Mod: 26

## 2023-04-19 PROCEDURE — 51600 INJECTION FOR BLADDER X-RAY: CPT

## 2023-04-19 PROCEDURE — 99024 POSTOP FOLLOW-UP VISIT: CPT

## 2023-04-19 NOTE — PHYSICAL EXAM
[FreeTextEntry1] : General: Well, appearing, no acute distress\par HEENT: Normocephalic, atraumatic\par Respiratory: Speaking in full sentences comfortably, normal work of breathing and no cough during visit\par Extremities: No upper extremity edema noted\par Skin: No obvious rash or skin lesions\par Neuro: Alert and oriented x 3, speech is fluent, normal rate\par Psych: Normal mood and affect [Normal] : was normal [Normal Appearance] : general appearance was normal [FreeTextEntry3] : No fluid or discharge expressed after void. [FreeTextEntry4] : Anterior wall incision intact and healing well.

## 2023-04-19 NOTE — DISCUSSION/SUMMARY
[FreeTextEntry1] : Overall normal VCUG. S/p removal of Akins catheter. \par Precautions reviewed, advised continued pelvic rest. RTO in 4 weeks or earlier PRN.

## 2023-05-15 ENCOUNTER — APPOINTMENT (OUTPATIENT)
Dept: UROGYNECOLOGY | Facility: CLINIC | Age: 38
End: 2023-05-15

## 2023-05-22 ENCOUNTER — APPOINTMENT (OUTPATIENT)
Dept: UROGYNECOLOGY | Facility: CLINIC | Age: 38
End: 2023-05-22

## 2023-06-08 ENCOUNTER — APPOINTMENT (OUTPATIENT)
Dept: UROGYNECOLOGY | Facility: CLINIC | Age: 38
End: 2023-06-08
Payer: COMMERCIAL

## 2023-06-08 VITALS
WEIGHT: 205 LBS | DIASTOLIC BLOOD PRESSURE: 73 MMHG | HEART RATE: 72 BPM | SYSTOLIC BLOOD PRESSURE: 114 MMHG | BODY MASS INDEX: 35 KG/M2 | HEIGHT: 64 IN

## 2023-06-08 DIAGNOSIS — Z98.890 OTHER SPECIFIED POSTPROCEDURAL STATES: ICD-10-CM

## 2023-06-08 DIAGNOSIS — N39.3 STRESS INCONTINENCE (FEMALE) (MALE): ICD-10-CM

## 2023-06-08 PROCEDURE — 99212 OFFICE O/P EST SF 10 MIN: CPT | Mod: 24

## 2023-06-11 NOTE — HISTORY OF PRESENT ILLNESS
[FreeTextEntry1] : Allyson presents for follow-up s/p urethral diverticulectomy on 2/3/23. Reports doing well overall. However has started noticing some stress urinary incontinence.

## 2023-06-11 NOTE — PHYSICAL EXAM
[FreeTextEntry1] : General: Well, appearing, no acute distress\par HEENT: Normocephalic, atraumatic\par Respiratory: Speaking in full sentences comfortably, normal work of breathing and no cough during visit\par Extremities: No upper extremity edema noted\par Skin: No obvious rash or skin lesions\par Neuro: Alert and oriented x 3, speech is fluent, normal rate\par Psych: Normal mood and affect\par  [Normal] : was normal [FreeTextEntry3] : no masses or leakage noted with palpation of anterior vaginal wall [FreeTextEntry4] : Well healed anterior vaginal wall incision.

## 2023-06-11 NOTE — DISCUSSION/SUMMARY
[FreeTextEntry1] : Doing well s/p urethral diverticulectomy, except for new REJI. We discussed management options and recommendation to delay surgical correction until as least 3-6 months post-op to prevent complications. We discussed options, including PFPT, sling, bulking. She will ultimately like to undergo MUS, but will start with pelvic floor PT. Referral for [facility] provided. RTO in 3 months.

## 2023-06-15 ENCOUNTER — APPOINTMENT (OUTPATIENT)
Dept: ORTHOPEDIC SURGERY | Facility: CLINIC | Age: 38
End: 2023-06-15
Payer: COMMERCIAL

## 2023-06-15 VITALS
OXYGEN SATURATION: 98 % | DIASTOLIC BLOOD PRESSURE: 72 MMHG | WEIGHT: 205 LBS | HEIGHT: 64.5 IN | TEMPERATURE: 98.1 F | BODY MASS INDEX: 34.57 KG/M2 | HEART RATE: 80 BPM | SYSTOLIC BLOOD PRESSURE: 108 MMHG

## 2023-06-15 DIAGNOSIS — S83.411A SPRAIN OF MEDIAL COLLATERAL LIGAMENT OF RIGHT KNEE, INITIAL ENCOUNTER: ICD-10-CM

## 2023-06-15 DIAGNOSIS — M23.91 UNSPECIFIED INTERNAL DERANGEMENT OF RIGHT KNEE: ICD-10-CM

## 2023-06-15 PROCEDURE — 99213 OFFICE O/P EST LOW 20 MIN: CPT

## 2023-09-20 ENCOUNTER — APPOINTMENT (OUTPATIENT)
Dept: UROGYNECOLOGY | Facility: CLINIC | Age: 38
End: 2023-09-20

## 2024-03-13 NOTE — ASU DISCHARGE PLAN (ADULT/PEDIATRIC) - WAS YOUR LAST COVID-19 VACCINE GREATER THAN OR EQUAL TO TWO MONTHS AGO?
Subjective   Patient ID: Brooke Lofton is a 62 y.o. female who presents for Conjunctivitis (Eyes pink since Monday. Some drainage but not a whole lot. Allergy eye drops are not helping.).    Conjunctivitis          Eye looking very red  - right started a few days ago   Now the left -   No discharge   Just watery -   No pain   No vision issues      Review of Systems    Objective   /62 (BP Location: Right arm, Patient Position: Sitting, BP Cuff Size: Large adult)   Pulse 81   Wt 63 kg (139 lb)   SpO2 99%   BMI 22.44 kg/m²     Physical Exam  Vitals reviewed.   Constitutional:       Appearance: Normal appearance.   HENT:      Right Ear: Tympanic membrane normal.      Left Ear: Tympanic membrane normal.   Eyes:      General:         Right eye: No discharge.         Left eye: No discharge.      Comments: Right eye injected,  more than left  - no discharge    Neurological:      Mental Status: She is alert.         Assessment/Plan   Problem List Items Addressed This Visit    None  Visit Diagnoses         Codes    Acute conjunctivitis of both eyes, unspecified acute conjunctivitis type    -  Primary H10.33    Relevant Medications    tobramycin (Tobrex) 0.3 % ophthalmic solution          I think likely viral - gave her tobra to try     Discussed what to watch for    We discussed at visit any disease processes that were of concern as well as the risks, benefits and instructions of any new medication provided.    See orders and discussion section for information handed to patient on their Clinical Summary.   Patient (and/or caretaker of patient if present)  stated all questions were answered, and they voiced understanding of instructions.       Yes

## 2024-10-24 ENCOUNTER — APPOINTMENT (OUTPATIENT)
Dept: ORTHOPEDIC SURGERY | Facility: CLINIC | Age: 39
End: 2024-10-24
Payer: COMMERCIAL

## 2024-10-24 VITALS — WEIGHT: 205 LBS | HEIGHT: 64 IN | BODY MASS INDEX: 35 KG/M2

## 2024-10-24 DIAGNOSIS — M25.562 PAIN IN LEFT KNEE: ICD-10-CM

## 2024-10-24 PROCEDURE — 73562 X-RAY EXAM OF KNEE 3: CPT | Mod: LT

## 2024-10-24 PROCEDURE — 99213 OFFICE O/P EST LOW 20 MIN: CPT

## (undated) DEVICE — NDL SPINAL 25G X 3.5" (BLUE)

## (undated) DEVICE — MEDICATION LABELS W MARKER

## (undated) DEVICE — SUT POLYSORB 3-0 30" V-20 UNDYED

## (undated) DEVICE — SUT POLYSORB 0 30" GS-21 UNDYED

## (undated) DEVICE — DRSG STOCKINETTE IMPERVIOUS LG

## (undated) DEVICE — TUBING RANGER FLUID IRRIGATION SET DISP

## (undated) DEVICE — DRSG WEBRIL 4"

## (undated) DEVICE — POSITIONER FOAM EGG CRATE ULNAR 2PCS (PINK)

## (undated) DEVICE — VENODYNE/SCD SLEEVE CALF LARGE

## (undated) DEVICE — TUBING LEVEL ONE NORMOFLO SET

## (undated) DEVICE — PACK EXTREMITY

## (undated) DEVICE — WARMING BLANKET UPPER ADULT

## (undated) DEVICE — SYR LUER LOK 10CC

## (undated) DEVICE — TOURNIQUET CUFF 34" DUAL PORT W PLC

## (undated) DEVICE — SOL IRR BAG NS 0.9% 1000ML

## (undated) DEVICE — TUBING IRR SET FOR CYSTOSCOPY 77"

## (undated) DEVICE — SUT MAXON 2-0 27" T-5

## (undated) DEVICE — BLADE SCALPEL SAFETYLOCK #15

## (undated) DEVICE — POSITIONER STRAP ARMBOARD VELCRO TS-30

## (undated) DEVICE — GLV 6.5 PROTEXIS (WHITE)

## (undated) DEVICE — ELCTR E/S BUGBEE 6FR 37CM GR

## (undated) DEVICE — PACK CYSTO

## (undated) DEVICE — FOLEY TRAY 16FR LF URINE METER SURESTEP

## (undated) DEVICE — SOL IRR BAG H2O 3000ML

## (undated) DEVICE — DRILL BIT STRYKER 2.6MM

## (undated) DEVICE — POSITIONER CUSHION INSERT PRONE VIEW LG

## (undated) DEVICE — CANISTER DISPOSABLE THIN WALL 3000CC

## (undated) DEVICE — SOL IRR POUR H2O 250ML

## (undated) DEVICE — Device

## (undated) DEVICE — ELCTR E/S BUGBEE 8FR

## (undated) DEVICE — ACMI SELF-SEALING SEAL UP TO 7FR

## (undated) DEVICE — SOL IRR POUR NS 0.9% 500ML

## (undated) DEVICE — LONE STAR ELASTIC STAY HOOK 12MM BLUNT

## (undated) DEVICE — TUBING TUR 2 PRONG

## (undated) DEVICE — SPECIMEN CONTAINER 100ML

## (undated) DEVICE — DRAPE INSTRUMENT POUCH 6.75" X 11"

## (undated) DEVICE — SUT MONOSOF 3-0 18" C-14

## (undated) DEVICE — GLV 8.5 PROTEXIS (WHITE)

## (undated) DEVICE — DRSG XEROFORM 1 X 8"

## (undated) DEVICE — DRAPE MAYO STAND 30"

## (undated) DEVICE — DRSG CURITY GAUZE SPONGE 4 X 4" 12-PLY

## (undated) DEVICE — TUBING SUCTION 20FT

## (undated) DEVICE — NDL LABORIE INJETAK ADJUSTABLE TIP 35CM

## (undated) DEVICE — SYR LUER LOK 20CC

## (undated) DEVICE — GLV 7.5 PROTEXIS (WHITE)

## (undated) DEVICE — DRSG ACE BANDAGE 4" NS

## (undated) DEVICE — NDL HYPO SAFE 18G X 1.5" (PINK)

## (undated) DEVICE — GLV 8 PROTEXIS (WHITE)

## (undated) DEVICE — DRAPE TOWEL BLUE 17" X 24"

## (undated) DEVICE — DRAPE C ARM C-ARMOUR

## (undated) DEVICE — POSITIONER FOAM HEADREST (PINK)

## (undated) DEVICE — SUT MAXON 2-0 30" GS-22

## (undated) DEVICE — DRAPE U 47X51" NON STERILE

## (undated) DEVICE — FOLEY TRAY 16FR 5CC LTX UMETER CLOSED

## (undated) DEVICE — LONE STAR RETRACTOR RING 32.5CM X 18.3CM DISP

## (undated) DEVICE — GLV 8 PROTEXIS (BLUE)

## (undated) DEVICE — ELCTR GROUNDING PAD ADULT COVIDIEN

## (undated) DEVICE — DRILL BIT STRYKER 3.5X122MM

## (undated) DEVICE — SUCTION YANKAUER NO CONTROL VENT

## (undated) DEVICE — PACK MINOR

## (undated) DEVICE — DRAPE C ARM UNIVERSAL

## (undated) DEVICE — DRAPE LAVH 124" X 30" X125"

## (undated) DEVICE — SUT VICRYL 2-0 27" CT-2

## (undated) DEVICE — SUT POLYSORB 2-0 30" GS-21 UNDYED

## (undated) DEVICE — VENODYNE/SCD SLEEVE CALF MEDIUM